# Patient Record
Sex: MALE | Race: WHITE | NOT HISPANIC OR LATINO | Employment: UNEMPLOYED | URBAN - METROPOLITAN AREA
[De-identification: names, ages, dates, MRNs, and addresses within clinical notes are randomized per-mention and may not be internally consistent; named-entity substitution may affect disease eponyms.]

---

## 2018-10-16 ENCOUNTER — OFFICE VISIT (OUTPATIENT)
Dept: FAMILY MEDICINE | Facility: CLINIC | Age: 23
End: 2018-10-16
Payer: COMMERCIAL

## 2018-10-16 VITALS
TEMPERATURE: 98.1 F | DIASTOLIC BLOOD PRESSURE: 72 MMHG | HEIGHT: 72 IN | WEIGHT: 164.6 LBS | BODY MASS INDEX: 22.29 KG/M2 | HEART RATE: 78 BPM | OXYGEN SATURATION: 97 % | SYSTOLIC BLOOD PRESSURE: 117 MMHG

## 2018-10-16 DIAGNOSIS — F33.1 MODERATE EPISODE OF RECURRENT MAJOR DEPRESSIVE DISORDER (H): ICD-10-CM

## 2018-10-16 DIAGNOSIS — Z23 NEED FOR VACCINATION: ICD-10-CM

## 2018-10-16 DIAGNOSIS — Z00.00 ROUTINE GENERAL MEDICAL EXAMINATION AT A HEALTH CARE FACILITY: Primary | ICD-10-CM

## 2018-10-16 PROCEDURE — 90686 IIV4 VACC NO PRSV 0.5 ML IM: CPT | Performed by: PHYSICIAN ASSISTANT

## 2018-10-16 PROCEDURE — 90472 IMMUNIZATION ADMIN EACH ADD: CPT | Performed by: PHYSICIAN ASSISTANT

## 2018-10-16 PROCEDURE — 90715 TDAP VACCINE 7 YRS/> IM: CPT | Performed by: PHYSICIAN ASSISTANT

## 2018-10-16 PROCEDURE — 99385 PREV VISIT NEW AGE 18-39: CPT | Mod: 25 | Performed by: PHYSICIAN ASSISTANT

## 2018-10-16 PROCEDURE — 99213 OFFICE O/P EST LOW 20 MIN: CPT | Mod: 25 | Performed by: PHYSICIAN ASSISTANT

## 2018-10-16 PROCEDURE — 90471 IMMUNIZATION ADMIN: CPT | Performed by: PHYSICIAN ASSISTANT

## 2018-10-16 RX ORDER — ESCITALOPRAM OXALATE 20 MG/1
TABLET ORAL
Qty: 30 TABLET | Refills: 5 | Status: SHIPPED | OUTPATIENT
Start: 2018-10-16 | End: 2019-06-18

## 2018-10-16 ASSESSMENT — PATIENT HEALTH QUESTIONNAIRE - PHQ9
SUM OF ALL RESPONSES TO PHQ QUESTIONS 1-9: 12
SUM OF ALL RESPONSES TO PHQ QUESTIONS 1-9: 12
10. IF YOU CHECKED OFF ANY PROBLEMS, HOW DIFFICULT HAVE THESE PROBLEMS MADE IT FOR YOU TO DO YOUR WORK, TAKE CARE OF THINGS AT HOME, OR GET ALONG WITH OTHER PEOPLE: VERY DIFFICULT

## 2018-10-16 NOTE — PROGRESS NOTES
SUBJECTIVE:   CC: Rivera Paz is an 23 year old male who presents for preventative health visit.     Physical   Annual:     Getting at least 3 servings of Calcium per day:  NO    Bi-annual eye exam:  Yes    Dental care twice a year:  NO    Sleep apnea or symptoms of sleep apnea:  Daytime drowsiness    Diet:  Regular (no restrictions)    Frequency of exercise:  1 day/week    Duration of exercise:  45-60 minutes    Taking medications regularly:  Yes    Medication side effects:  Not applicable and None    Additional concerns today:  No    22 y/o NP male here to discuss some ongoing depression.  He was diagnosed with depression around 17 and started with medications.  He has tried a few medications but has been on lexapro for the last while.  He was initially seen by psychiatry and then followed with pediatrics.  He has done some therapy for a few years, and does think that is was helpful.  He is thinking about going back.  He does feel safe at home and has no plans of hurting himself.    Besides mental health, he otherwise considers himself healthy.      He is active in music both listening and writing.      Today's PHQ-2 Score:   PHQ-2 ( 1999 [employer]) 10/16/2018   Q1: Little interest or pleasure in doing things 2   Q2: Feeling down, depressed or hopeless 2   PHQ-2 Score 4   Q1: Little interest or pleasure in doing things More than half the days   Q2: Feeling down, depressed or hopeless More than half the days   PHQ-2 Score 4     Abuse: Current or Past(Physical, Sexual or Emotional)- No  Do you feel safe in your environment - Yes    Social History   Substance Use Topics     Smoking status: Never Smoker     Smokeless tobacco: Never Used      Comment: Vape     Alcohol use Yes     Alcohol Use 10/16/2018   If you drink alcohol do you typically have greater than 3 drinks per day OR greater than 7 drinks per week? Yes   AUDIT SCORE  12     AUDIT - Alcohol Use Disorders Identification Test - Reproduced from the World  Health Organization Audit 2001 (Second Edition) 10/16/2018   1.  How often do you have a drink containing alcohol? 2 to 3 times a week   2.  How many drinks containing alcohol do you have on a typical day when you are drinking? 5 or 6   3.  How often do you have five or more drinks on one occasion? Less than monthly   4.  How often during the last year have you found that you were not able to stop drinking once you had started? Less than monthly   5.  How often during the last year have you failed to do what was normally expected of you because of drinking? Never   6.  How often during the last year have you needed a first drink in the morning to get yourself going after a heavy drinking session? Less than monthly   7.  How often during the last year have you had a feeling of guilt or remorse after drinking? Monthly   8.  How often during the last year have you been unable to remember what happened the night before because of your drinking? Never   9.  Have you or someone else been injured because of your drinking? No   10. Has a relative, friend, doctor or other health care worker been concerned about your drinking or suggested you cut down? Yes, but not in the last year   TOTAL SCORE 12       Last PSA: No results found for: PSA    Reviewed orders with patient. Reviewed health maintenance and updated orders accordingly - Yes  BP Readings from Last 3 Encounters:   10/16/18 117/72    Wt Readings from Last 3 Encounters:   10/16/18 164 lb 9.6 oz (74.7 kg)                    Reviewed and updated as needed this visit by clinical staff  Tobacco  Allergies  Meds  Soc Hx        Reviewed and updated as needed this visit by Provider            Review of Systems  CONSTITUTIONAL: NEGATIVE for fever, chills, change in weight  INTEGUMENTARY/SKIN: NEGATIVE for worrisome rashes, moles or lesions  EYES: NEGATIVE for vision changes or irritation  ENT: NEGATIVE for ear, mouth and throat problems  RESP: NEGATIVE for significant  cough or SOB  CV: NEGATIVE for chest pain, palpitations or peripheral edema  GI: NEGATIVE for nausea, abdominal pain, heartburn, or change in bowel habits   male: negative for dysuria, hematuria, decreased urinary stream, erectile dysfunction, urethral discharge  MUSCULOSKELETAL: NEGATIVE for significant arthralgias or myalgia  NEURO: NEGATIVE for weakness, dizziness or paresthesias  PSYCHIATRIC: as above    OBJECTIVE:   /72  Pulse 78  Temp 98.1  F (36.7  C) (Oral)  Ht 6' (1.829 m)  Wt 164 lb 9.6 oz (74.7 kg)  SpO2 97%  BMI 22.32 kg/m2    Physical Exam  GENERAL: alert and no distress  EYES: Eyes grossly normal to inspection, PERRL and conjunctivae and sclerae normal  HENT: ear canals and TM's normal, nose and mouth without ulcers or lesions  NECK: no adenopathy, no asymmetry, masses, or scars and thyroid normal to palpation  RESP: lungs clear to auscultation - no rales, rhonchi or wheezes  CV: regular rate and rhythm, normal S1 S2, no S3 or S4, no murmur, click or rub, no peripheral edema and peripheral pulses strong  ABDOMEN: soft, nontender, no hepatosplenomegaly, no masses and bowel sounds normal  MS: no gross musculoskeletal defects noted, no edema  SKIN: no suspicious lesions or rashes  NEURO: Normal strength and tone, mentation intact and speech normal  PSYCH: mentation appears normal, affect normal/bright    Diagnostic Test Results:  none     ASSESSMENT/PLAN:   1. Routine general medical examination at a health care facility      2. Moderate episode of recurrent major depressive disorder (H)  Will get him back on medication.  Discussed safety issues and if he does feel safe at home.  - escitalopram (LEXAPRO) 20 MG tablet; Take 1/2 tablet (10 mg) for 1-2 weeks, then increase to 1 tablet orally daily  Dispense: 30 tablet; Refill: 5    3. Need for vaccination    - TDAP VACCINE (ADACEL)  - FLU VAC PRESRV FREE QUAD SPLIT VIR, IM (3+ YRS)  - ADMIN 1st VACCINE  - EA ADD'L VACCINE    COUNSELING:    Reviewed preventive health counseling, as reflected in patient instructions       Regular exercise       Healthy diet/nutrition       Immunizations    Vaccinated for: Influenza and TDAP             Safe sex practices/STD prevention    BP Readings from Last 1 Encounters:   10/16/18 117/72     Estimated body mass index is 22.32 kg/(m^2) as calculated from the following:    Height as of this encounter: 6' (1.829 m).    Weight as of this encounter: 164 lb 9.6 oz (74.7 kg).           reports that he has never smoked. He has never used smokeless tobacco.      Counseling Resources:  ATP IV Guidelines  Pooled Cohorts Equation Calculator  FRAX Risk Assessment  ICSI Preventive Guidelines  Dietary Guidelines for Americans, 2010  USDA's MyPlate  ASA Prophylaxis  Lung CA Screening    Leeroy Ewing PA-C  [facility]

## 2018-10-16 NOTE — MR AVS SNAPSHOT
After Visit Summary   10/16/2018    Rivera Paz    MRN: [id number]           Patient Information     Date Of Birth          1995        Visit Information        Provider Department      10/16/2018 6:00 PM Leeroy Ewing PA-C [facility]        Today's Diagnoses     Routine general medical examination at a health care facility    -  1    Moderate episode of recurrent major depressive disorder (H)        Need for vaccination          Care Instructions      Preventive Health Recommendations  Male Ages 21 - 25     Yearly exam:             See your health care provider every year in order to  o   Review health changes.   o   Discuss preventive care.    o   Review your medicines if your doctor has prescribed any.    You should be tested each year for STDs (sexually transmitted diseases).     Talk to your provider about cholesterol testing.      If you are at risk for diabetes, you should have a diabetes test (fasting glucose).    Shots: Get a flu shot each year. Get a tetanus shot every 10 years.     Nutrition:    Eat at least 5 servings of fruits and vegetables daily.     Eat whole-grain bread, whole-wheat pasta and brown rice instead of white grains and rice.     Get adequate calcium and Vitamin D.     Lifestyle    Exercise for at least 150 minutes a week (30 minutes a day, 5 days a week). This will help you control your weight and prevent disease.     Limit alcohol to one drink per day.     No smoking.     Wear sunscreen to prevent skin cancer.     See your dentist every six months for an exam and cleaning.             Follow-ups after your visit        Who to contact     If you have questions or need follow up information about today's clinic visit or your schedule please contact [facility] directly at [phone].  Normal or non-critical lab and imaging results will be communicated to you by MyChart, letter or phone within 4 business days after the clinic  "has received the results. If you do not hear from us within 7 days, please contact the clinic through [employer] or phone. If you have a critical or abnormal lab result, we will notify you by phone as soon as possible.  Submit refill requests through [employer] or call your pharmacy and they will forward the refill request to us. Please allow 3 business days for your refill to be completed.          Additional Information About Your Visit        [employer] Information     [employer] lets you send messages to your doctor, view your test results, renew your prescriptions, schedule appointments and more. To sign up, go to [email or web address] . Click on \"Log in\" on the left side of the screen, which will take you to the Welcome page. Then click on \"Sign up Now\" on the right side of the page.     You will be asked to enter the access code listed below, as well as some personal information. Please follow the directions to create your username and password.     Your access code is: [email or web address]  Expires: 2019  6:25 PM     Your access code will  in 90 days. If you need help or a new code, please call your [place] clinic or [phone].        Care EveryWhere ID     This is your Care EveryWhere ID. This could be used by other organizations to access your [place] medical records  [id number]        Your Vitals Were     Pulse Temperature Height Pulse Oximetry BMI (Body Mass Index)       78 98.1  F (36.7  C) (Oral) 6' (1.829 m) 97% 22.32 kg/m2        Blood Pressure from Last 3 Encounters:   10/16/18 117/72    Weight from Last 3 Encounters:   10/16/18 164 lb 9.6 oz (74.7 kg)              We Performed the Following     ADMIN 1st VACCINE     EA ADD'L VACCINE     FLU VAC PRESRV FREE QUAD SPLIT VIR, IM (3+ YRS)     TDAP VACCINE (ADACEL)          Today's Medication Changes          These changes are accurate as of 10/16/18  6:25 PM.  If you have any questions, ask your nurse or doctor.               Start taking these " medicines.        Dose/Directions    escitalopram 20 MG tablet   Commonly known as:  LEXAPRO   Used for:  Moderate episode of recurrent major depressive disorder (H)   Started by:  Leeroy Ewing PA-C        Take 1/2 tablet (10 mg) for 1-2 weeks, then increase to 1 tablet orally daily   Quantity:  30 tablet   Refills:  5            Where to get your medicines      These medications were sent to [facility] [id number] [place] [address] [address] AT [facility] [address], [facility] [address]     Phone:  [phone]     escitalopram 20 MG tablet                Primary Care Provider Office Phone # Fax #    Leeroy Ewing PA-C [phone] [address] [address]        Equal Access to Services     BRITTANIE TREJO : Hadii aad ku hadasho Soomaali, waaxda luqadaha, qaybta kaalmada adeegyada, matthew pengin hayuvaldo booker . So [facility] [phone].    ATENCIÓN: Si habla español, tiene a floyd disposición servicios gratuitos de asistencia lingüística. Luiza al [phone].    We comply with applicable federal civil rights laws and [place] laws. We do not discriminate on the basis of race, color, national origin, age, disability, sex, sexual orientation, or gender identity.            Thank you!     Thank you for choosing [facility]  for your care. Our goal is always to provide you with excellent care. Hearing back from our patients is one way we can continue to improve our services. Please take a few minutes to complete the written survey that you may receive in the mail after your visit with us. Thank you!             Your Updated Medication List - Protect others around you: Learn how to safely use, store and throw away your medicines at www.disposemymeds.org.          This list is accurate as of 10/16/18  6:25 PM.  Always use your most recent med list.                   Brand Name Dispense Instructions  for use Diagnosis    escitalopram 20 MG tablet    LEXAPRO    30 tablet    Take 1/2 tablet (10 mg) for 1-2 weeks, then increase to 1 tablet orally daily    Moderate episode of recurrent major depressive disorder (H)

## 2018-10-17 ASSESSMENT — PATIENT HEALTH QUESTIONNAIRE - PHQ9: SUM OF ALL RESPONSES TO PHQ QUESTIONS 1-9: 12

## 2018-10-18 PROBLEM — F33.1 MODERATE EPISODE OF RECURRENT MAJOR DEPRESSIVE DISORDER (H): Status: ACTIVE | Noted: 2018-10-18

## 2019-02-14 ENCOUNTER — OFFICE VISIT (OUTPATIENT)
Dept: FAMILY MEDICINE | Facility: CLINIC | Age: 24
End: 2019-02-14
Payer: COMMERCIAL

## 2019-02-14 VITALS
DIASTOLIC BLOOD PRESSURE: 83 MMHG | SYSTOLIC BLOOD PRESSURE: 117 MMHG | OXYGEN SATURATION: 94 % | TEMPERATURE: 98.6 F | BODY MASS INDEX: 21.05 KG/M2 | HEART RATE: 106 BPM | WEIGHT: 155.4 LBS | HEIGHT: 72 IN

## 2019-02-14 DIAGNOSIS — F33.1 MODERATE EPISODE OF RECURRENT MAJOR DEPRESSIVE DISORDER (H): Primary | ICD-10-CM

## 2019-02-14 DIAGNOSIS — R41.840 INATTENTION: ICD-10-CM

## 2019-02-14 DIAGNOSIS — R30.0 DYSURIA: ICD-10-CM

## 2019-02-14 LAB
ALBUMIN UR-MCNC: 30 MG/DL
APPEARANCE UR: CLEAR
BILIRUB UR QL STRIP: ABNORMAL
CAOX CRY #/AREA URNS HPF: ABNORMAL /HPF
COLOR UR AUTO: YELLOW
GLUCOSE UR STRIP-MCNC: NEGATIVE MG/DL
HGB UR QL STRIP: NEGATIVE
KETONES UR STRIP-MCNC: 15 MG/DL
LEUKOCYTE ESTERASE UR QL STRIP: NEGATIVE
MUCOUS THREADS #/AREA URNS LPF: PRESENT /LPF
NITRATE UR QL: NEGATIVE
PH UR STRIP: 6.5 PH (ref 5–7)
RBC #/AREA URNS AUTO: ABNORMAL /HPF
SOURCE: ABNORMAL
SP GR UR STRIP: 1.02 (ref 1–1.03)
UROBILINOGEN UR STRIP-ACNC: 2 EU/DL (ref 0.2–1)
WBC #/AREA URNS AUTO: ABNORMAL /HPF

## 2019-02-14 PROCEDURE — 99214 OFFICE O/P EST MOD 30 MIN: CPT | Performed by: PHYSICIAN ASSISTANT

## 2019-02-14 PROCEDURE — 81001 URINALYSIS AUTO W/SCOPE: CPT | Performed by: PHYSICIAN ASSISTANT

## 2019-02-14 RX ORDER — BUPROPION HYDROCHLORIDE 150 MG/1
150 TABLET ORAL EVERY MORNING
Qty: 30 TABLET | Refills: 3 | Status: SHIPPED | OUTPATIENT
Start: 2019-02-14 | End: 2019-06-18

## 2019-02-14 ASSESSMENT — MIFFLIN-ST. JEOR: SCORE: 1737.89

## 2019-02-14 NOTE — PROGRESS NOTES
SUBJECTIVE:   Rivera Paz is a 23 year old male who presents to clinic today for the following health issues:      Pt requesting ADD medication/concerta    Problem list and histories reviewed & adjusted, as indicated.  Additional history: 22 y/o male here to discuss ADD.  He was diagnosed when he was very young.  States he was on concerta for awhile, but had some side effects, trouble eating so did stop.  Took very shortly in high school but also had side effects.      He has been having more symptoms of fatigue and trouble with work.  Has been diagnosed with depression and is currently on lexapro.  He does think he depression is fairly well controlled, but does sometimes lack motivation.    At the end of the visit, he did mention that he has had long term discomfort when he urinates.  He was evaluated by UC in the past, treated for infection and referred to urology.  Never followed up.    BP Readings from Last 3 Encounters:   02/14/19 117/83   10/16/18 117/72    Wt Readings from Last 3 Encounters:   02/14/19 70.5 kg (155 lb 6.4 oz)   10/16/18 74.7 kg (164 lb 9.6 oz)                    Reviewed and updated as needed this visit by clinical staff       Reviewed and updated as needed this visit by Provider         ROS:  Constitutional, HEENT, cardiovascular, pulmonary, gi and gu systems are negative, except as otherwise noted.    OBJECTIVE:     /83   Pulse 106   Temp 98.6  F (37  C) (Oral)   Ht 1.829 m (6')   Wt 70.5 kg (155 lb 6.4 oz)   SpO2 94%   BMI 21.08 kg/m    Body mass index is 21.08 kg/m .  GENERAL: alert and no distress  EYES: Eyes grossly normal to inspection  HENT: ear canals and TM's normal, nose and mouth without ulcers or lesions  RESP: lungs clear to auscultation - no rales, rhonchi or wheezes  CV: regular rate and rhythm, normal S1 S2, no S3 or S4, no murmur, click or rub, no peripheral edema and peripheral pulses strong  PSYCH: mentation appears normal, affect  normal/bright    Diagnostic Test Results:  none     ASSESSMENT/PLAN:             1. Moderate episode of recurrent major depressive disorder (H)  Discussed his symptoms, and we both agree that further testing to help fully determine if ADD playing a role.  Discussed wellbutrin may help his depression and has been used for ADHD as well.   We discussed the potential benefits and side effects including making symptoms worse and he did want to try.    - buPROPion (WELLBUTRIN XL) 150 MG 24 hr tablet; Take 1 tablet (150 mg) by mouth every morning  Dispense: 30 tablet; Refill: 3  - Urine Microscopic    2. Inattention    - MENTAL HEALTH REFERRAL  - Adult; Assessments and Testing; ADHD; [facility] [phone]; We will contact you to schedule the appointment or please call with any questions    3. Dysuria  Will contact him with results of UA to determine next step.  - *UA reflex to Microscopic and Culture ([place] [facility] (except [place] and [place])    Would like him to contact me via mychart or phone in 1-2 weeks and then again in 4 months.    Leeroy Ewing PA-C  [facility]

## 2019-02-14 NOTE — NURSING NOTE
Chief Complaint   Patient presents with     Medication Request     concerta     /83   Pulse 106   Temp 98.6  F (37  C) (Oral)   Ht 1.829 m (6')   Wt 70.5 kg (155 lb 6.4 oz)   SpO2 94%   BMI 21.08 kg/m   Estimated body mass index is 21.08 kg/m  as calculated from the following:    Height as of this encounter: 1.829 m (6').    Weight as of this encounter: 70.5 kg (155 lb 6.4 oz).        Health Maintenance due pending provider review:  NONE    n/a    Betzaida Browning, JEAN

## 2019-02-26 NOTE — RESULT ENCOUNTER NOTE
Dear Rivera    I just wanted to check in with you and your recent urine tests that we did at our visit.  I know you mentioned some discomfort with urinating, and that you had this going back to last year.  When I look at your urine test from our visit and compare it to the one in the fall, you show a bit of protein in your urine both times.  Last time you had some bacteria, which is why they treated you with antibiotic.  Since this has been on going, I think our next step is to check some blood work to see if your kidneys are related, or if this is purely a urinary/bladder issue.  That way, if we need to see a specialist, we know who is the most appropriate.  You do not need to see me, but can just schedule a lab only appointment where we will recheck your urine and check some blood work.  Let me know if that works, or if you have any further questions.        Darryl Ewing PA-C

## 2019-06-13 DIAGNOSIS — F33.1 MODERATE EPISODE OF RECURRENT MAJOR DEPRESSIVE DISORDER (H): ICD-10-CM

## 2019-06-13 NOTE — TELEPHONE ENCOUNTER
"Bupropion XL   Last Written Prescription Date:  02/14/2019  Last Fill Quantity: 30,  # refills: 3   Last office visit: 2/14/2019 with prescribing provider:  Yes    Future Office Visit:      Lexapro  Last Written Prescription Date:  10/16/2018  Last Fill Quantity: 30,  # refills: 5   Last office visit: 2/14/2019 with prescribing provider:  Yes    Future Office Visit:      Requested Prescriptions   Pending Prescriptions Disp Refills     buPROPion (WELLBUTRIN XL) 150 MG 24 hr tablet 30 tablet 3     Sig: Take 1 tablet (150 mg) by mouth every morning       SSRIs Protocol Failed - 6/13/2019 12:47 PM        Failed - PHQ-9 score less than 5 in past 6 months     Please review last PHQ-9 score.           Passed - Medication is Bupropion     If the medication is Bupropion (Wellbutrin), and the patient is taking for smoking cessation; OK to refill.          Passed - Medication is active on med list        Passed - Patient is age 18 or older        Passed - Recent (6 mo) or future (30 days) visit within the authorizing provider's specialty     Patient had office visit in the last 6 months or has a visit in the next 30 days with authorizing provider or within the authorizing provider's specialty.  See \"Patient Info\" tab in inbasket, or \"Choose Columns\" in Meds & Orders section of the refill encounter.            escitalopram (LEXAPRO) 20 MG tablet 30 tablet 5     Sig: Take 1/2 tablet (10 mg) for 1-2 weeks, then increase to 1 tablet orally daily       SSRIs Protocol Failed - 6/13/2019 12:47 PM        Failed - PHQ-9 score less than 5 in past 6 months     Please review last PHQ-9 score.           Passed - Medication is Bupropion     If the medication is Bupropion (Wellbutrin), and the patient is taking for smoking cessation; OK to refill.          Passed - Medication is active on med list        Passed - Patient is age 18 or older        Passed - Recent (6 mo) or future (30 days) visit within the authorizing provider's specialty     " "Patient had office visit in the last 6 months or has a visit in the next 30 days with authorizing provider or within the authorizing provider's specialty.  See \"Patient Info\" tab in inbasket, or \"Choose Columns\" in Meds & Orders section of the refill encounter.              "

## 2019-06-13 NOTE — TELEPHONE ENCOUNTER
PHQ9 sent to patient via [employer]. (last done 10/16/18)  Note from 2/14/19 OV:  Would like him to contact me via [employer] or phone in 1-2 weeks and then again in 4 months.    Debora Anne RN

## 2019-06-18 ENCOUNTER — MYC REFILL (OUTPATIENT)
Dept: FAMILY MEDICINE | Facility: CLINIC | Age: 24
End: 2019-06-18

## 2019-06-18 DIAGNOSIS — F33.1 MODERATE EPISODE OF RECURRENT MAJOR DEPRESSIVE DISORDER (H): ICD-10-CM

## 2019-06-18 RX ORDER — BUPROPION HYDROCHLORIDE 150 MG/1
150 TABLET ORAL EVERY MORNING
Qty: 90 TABLET | Refills: 0 | Status: SHIPPED | OUTPATIENT
Start: 2019-06-18 | End: 2019-08-29

## 2019-06-18 RX ORDER — BUPROPION HYDROCHLORIDE 150 MG/1
150 TABLET ORAL EVERY MORNING
Qty: 30 TABLET | Refills: 3 | Status: CANCELLED | OUTPATIENT
Start: 2019-06-18

## 2019-06-18 RX ORDER — ESCITALOPRAM OXALATE 20 MG/1
20 TABLET ORAL DAILY
Qty: 90 TABLET | Refills: 0 | Status: SHIPPED | OUTPATIENT
Start: 2019-06-18 | End: 2019-08-29

## 2019-06-18 RX ORDER — ESCITALOPRAM OXALATE 20 MG/1
TABLET ORAL
Qty: 30 TABLET | Refills: 5 | Status: CANCELLED | OUTPATIENT
Start: 2019-06-18

## 2019-06-18 ASSESSMENT — PATIENT HEALTH QUESTIONNAIRE - PHQ9: SUM OF ALL RESPONSES TO PHQ QUESTIONS 1-9: 0

## 2019-06-18 NOTE — TELEPHONE ENCOUNTER
PHQ9 updated via [employer]  Due for physical Oct 2019  Prescription approved per [facility] Refill Protocol.  Lizeth GATES RN

## 2019-06-18 NOTE — TELEPHONE ENCOUNTER
See other TE  Prescription approved per [facility] Refill Protocol today  MyChart sent to pt  Lizeth GATES RN

## 2019-08-29 ENCOUNTER — OFFICE VISIT (OUTPATIENT)
Dept: FAMILY MEDICINE | Facility: CLINIC | Age: 24
End: 2019-08-29
Payer: COMMERCIAL

## 2019-08-29 VITALS
OXYGEN SATURATION: 100 % | DIASTOLIC BLOOD PRESSURE: 82 MMHG | RESPIRATION RATE: 16 BRPM | TEMPERATURE: 98.5 F | WEIGHT: 158.6 LBS | HEIGHT: 72 IN | BODY MASS INDEX: 21.48 KG/M2 | SYSTOLIC BLOOD PRESSURE: 119 MMHG | HEART RATE: 78 BPM

## 2019-08-29 DIAGNOSIS — R80.9 PROTEINURIA, UNSPECIFIED TYPE: ICD-10-CM

## 2019-08-29 DIAGNOSIS — Z00.00 ROUTINE GENERAL MEDICAL EXAMINATION AT A HEALTH CARE FACILITY: Primary | ICD-10-CM

## 2019-08-29 DIAGNOSIS — F33.1 MODERATE EPISODE OF RECURRENT MAJOR DEPRESSIVE DISORDER (H): ICD-10-CM

## 2019-08-29 LAB
ALBUMIN UR-MCNC: 30 MG/DL
APPEARANCE UR: CLEAR
BILIRUB UR QL STRIP: NEGATIVE
COLOR UR AUTO: YELLOW
GLUCOSE UR STRIP-MCNC: NEGATIVE MG/DL
HGB UR QL STRIP: NEGATIVE
KETONES UR STRIP-MCNC: NEGATIVE MG/DL
LEUKOCYTE ESTERASE UR QL STRIP: NEGATIVE
MUCOUS THREADS #/AREA URNS LPF: PRESENT /LPF
NITRATE UR QL: NEGATIVE
PH UR STRIP: 8.5 PH (ref 5–7)
RBC #/AREA URNS AUTO: ABNORMAL /HPF
SOURCE: ABNORMAL
SP GR UR STRIP: 1.01 (ref 1–1.03)
UROBILINOGEN UR STRIP-ACNC: 1 EU/DL (ref 0.2–1)
WBC #/AREA URNS AUTO: ABNORMAL /HPF

## 2019-08-29 PROCEDURE — 99395 PREV VISIT EST AGE 18-39: CPT | Performed by: PHYSICIAN ASSISTANT

## 2019-08-29 PROCEDURE — 81001 URINALYSIS AUTO W/SCOPE: CPT | Performed by: PHYSICIAN ASSISTANT

## 2019-08-29 PROCEDURE — 99213 OFFICE O/P EST LOW 20 MIN: CPT | Mod: 25 | Performed by: PHYSICIAN ASSISTANT

## 2019-08-29 RX ORDER — BUPROPION HYDROCHLORIDE 150 MG/1
150 TABLET ORAL EVERY MORNING
Qty: 90 TABLET | Refills: 3 | Status: SHIPPED | OUTPATIENT
Start: 2019-08-29 | End: 2020-11-22

## 2019-08-29 RX ORDER — ESCITALOPRAM OXALATE 20 MG/1
20 TABLET ORAL DAILY
Qty: 90 TABLET | Refills: 3 | Status: SHIPPED | OUTPATIENT
Start: 2019-08-29 | End: 2020-11-22

## 2019-08-29 ASSESSMENT — PATIENT HEALTH QUESTIONNAIRE - PHQ9
SUM OF ALL RESPONSES TO PHQ QUESTIONS 1-9: 13
10. IF YOU CHECKED OFF ANY PROBLEMS, HOW DIFFICULT HAVE THESE PROBLEMS MADE IT FOR YOU TO DO YOUR WORK, TAKE CARE OF THINGS AT HOME, OR GET ALONG WITH OTHER PEOPLE: SOMEWHAT DIFFICULT
SUM OF ALL RESPONSES TO PHQ QUESTIONS 1-9: 13

## 2019-08-29 ASSESSMENT — MIFFLIN-ST. JEOR: SCORE: 1752.4

## 2019-08-29 NOTE — PROGRESS NOTES
Answers for HPI/ROS submitted by the patient on 8/29/2019   Annual Exam:  If you checked off any problems, how difficult have these problems made it for you to do your work, take care of things at home, or get along with other people?: Somewhat difficult  PHQ9 TOTAL SCORE: 13    Conflicting answers have been found for some questions. Please document the patient's answers manually. ***SUBJECTIVE:   CC: Rivera Paz is an 23 year old male who presents for preventative health visit.     Healthy Habits:     Getting at least 3 servings of Calcium per day:  Yes    Bi-annual eye exam:  Yes    Dental care twice a year:  NO    Sleep apnea or symptoms of sleep apnea:  Daytime drowsiness    Diet:  Regular (no restrictions)    Frequency of exercise:  4-5 days/week    Duration of exercise:  15-30 minutes    Taking medications regularly:  Yes    Medication side effects:  None    PHQ-2 Total Score: 3    Additional concerns today:  No    {Add if <65 person on Medicare  - Required Questions (Optional):922477}  {Outside tests to abstract? :317386}    {additional problems to add (Optional):669381}    Today's PHQ-2 Score:   PHQ-2 ( 1999 Pfizer) 8/29/2019   Q1: Little interest or pleasure in doing things 1   Q2: Feeling down, depressed or hopeless 2   PHQ-2 Score 3   Q1: Little interest or pleasure in doing things Several days   Q2: Feeling down, depressed or hopeless More than half the days   PHQ-2 Score 3       Abuse: Current or Past(Physical, Sexual or Emotional)- { :487834}  Do you feel safe in your environment? { :388697}    Social History     Tobacco Use     Smoking status: Never Smoker     Smokeless tobacco: Never Used     Tobacco comment: Vape   Substance Use Topics     Alcohol use: Yes     {Rooming Staff- Complete this question if Prescreen response is not shown below for today's visit. If you drink alcohol do you typically have >3 drinks per day or >7 drinks per week? (Optional):591008}    Alcohol Use 8/29/2019  "  Prescreen: >3 drinks/day or >7 drinks/week? Yes   Prescreen: >3 drinks/day or >7 drinks/week? -   AUDIT SCORE  17   {add AUDIT responses (Optional) (A score of 7 for adult men is an indication of hazardous drinking; a score of 8 or more is an indication of an alcohol use disorder.  A score of 7 or more for adult women is an indication of hazardous drinking or an alchohol use disorder):365443}    Last PSA: No results found for: PSA    Reviewed orders with patient. Reviewed health maintenance and updated orders accordingly - { :065037::\"Yes\"}  {Chronicprobdata (optional):178940}    Reviewed and updated as needed this visit by clinical staff  Tobacco  Allergies  Meds  Problems  Med Hx  Surg Hx  Fam Hx  Soc Hx          Reviewed and updated as needed this visit by Provider        {HISTORY OPTIONS (Optional):126281}    Review of Systems  {MALE ROS (Optional):159490::\"CONSTITUTIONAL: NEGATIVE for fever, chills, change in weight\",\"INTEGUMENTARY/SKIN: NEGATIVE for worrisome rashes, moles or lesions\",\"EYES: NEGATIVE for vision changes or irritation\",\"ENT: NEGATIVE for ear, mouth and throat problems\",\"RESP: NEGATIVE for significant cough or SOB\",\"CV: NEGATIVE for chest pain, palpitations or peripheral edema\",\"GI: NEGATIVE for nausea, abdominal pain, heartburn, or change in bowel habits\",\" male: negative for dysuria, hematuria, decreased urinary stream, erectile dysfunction, urethral discharge\",\"MUSCULOSKELETAL: NEGATIVE for significant arthralgias or myalgia\",\"NEURO: NEGATIVE for weakness, dizziness or paresthesias\",\"PSYCHIATRIC: NEGATIVE for changes in mood or affect\"}    OBJECTIVE:   There were no vitals taken for this visit.    Physical Exam  {Exam Choices (Optional):322633}    {Diagnostic Test Results (Optional):833323::\"Diagnostic Test Results:\",\"Labs reviewed in Epic\"}    ASSESSMENT/PLAN:   {Diag Picklist:284888}    COUNSELING:   {MALE COUNSELING MESSAGES:502210::\"Reviewed preventive health counseling, as " "reflected in patient instructions\"}    Estimated body mass index is 21.08 kg/m  as calculated from the following:    Height as of 2/14/19: 1.829 m (6').    Weight as of 2/14/19: 70.5 kg (155 lb 6.4 oz).     {Weight Management Plan (ACO) Complete if BMI is abnormal-  Ages 18-64  BMI >24.9.  Age 65+ with BMI <23 or >30 (Optional):565689}     reports that he has never smoked. He has never used smokeless tobacco.  {Tobacco Cessation -- Complete if patient is a smoker (Optional):562725}    Counseling Resources:  ATP IV Guidelines  Pooled Cohorts Equation Calculator  FRAX Risk Assessment  ICSI Preventive Guidelines  Dietary Guidelines for Americans, 2010  USDA's MyPlate  ASA Prophylaxis  Lung CA Screening    Leeroy Ewing PA-C  [facility]  "

## 2019-08-29 NOTE — PROGRESS NOTES
SUBJECTIVE:   CC: Rivera Paz is an 23 year old male who presents for preventative health visit.     Healthy Habits:     Getting at least 3 servings of Calcium per day:  Yes    Bi-annual eye exam:  Yes    Dental care twice a year:  NO    Sleep apnea or symptoms of sleep apnea:  Daytime drowsiness    Diet:  Regular (no restrictions)    Frequency of exercise:  4-5 days/week    Duration of exercise:  15-30 minutes    Taking medications regularly:  Yes    Medication side effects:  None    PHQ-2 Total Score: 3    Additional concerns today:  No          Depression Followup    How are you doing with your depression since your last visit? Improved     Are you having other symptoms that might be associated with depression? No    Have you had a significant life event?  No     Are you feeling anxious or having panic attacks?   No    Do you have any concerns with your use of alcohol or other drugs? No    Social History     Tobacco Use     Smoking status: Never Smoker     Smokeless tobacco: Never Used     Tobacco comment: Vape   Substance Use Topics     Alcohol use: Yes     Drug use: Yes     Types: Marijuana     PHQ 10/16/2018 6/18/2019 8/29/2019   PHQ-9 Total Score 12 0 13   Q9: Thoughts of better off dead/self-harm past 2 weeks Several days Not at all Several days   F/U: Thoughts of suicide or self-harm Yes - No   F/U: Safety concerns No - No     No flowsheet data found.  No flowsheet data found.  In the past two weeks have you had thoughts of suicide or self-harm?  No.    Do you have concerns about your personal safety or the safety of others?   No    Suicide Assessment Five-step Evaluation and Treatment (SAFE-T)    Today's PHQ-2 Score:   PHQ-2 ( 1999 Pfizer) 8/29/2019   Q1: Little interest or pleasure in doing things 1   Q2: Feeling down, depressed or hopeless 2   PHQ-2 Score 3   Q1: Little interest or pleasure in doing things Several days   Q2: Feeling down, depressed or hopeless More than half the days   PHQ-2  Score 3       Abuse: Current or Past(Physical, Sexual or Emotional)- No  Do you feel safe in your environment? Yes    Social History     Tobacco Use     Smoking status: Never Smoker     Smokeless tobacco: Never Used     Tobacco comment: Vape   Substance Use Topics     Alcohol use: Yes     If you drink alcohol do you typically have >3 drinks per day or >7 drinks per week? No    Alcohol Use 8/29/2019   Prescreen: >3 drinks/day or >7 drinks/week? Yes   Prescreen: >3 drinks/day or >7 drinks/week? -   AUDIT SCORE  17     AUDIT - Alcohol Use Disorders Identification Test - Reproduced from the World Health Organization Audit 2001 (Second Edition) 8/29/2019   1.  How often do you have a drink containing alcohol? 4 or more times a week   2.  How many drinks containing alcohol do you have on a typical day when you are drinking? 3 or 4   3.  How often do you have five or more drinks on one occasion? Monthly   4.  How often during the last year have you found that you were not able to stop drinking once you had started? Less than monthly   5.  How often during the last year have you failed to do what was normally expected of you because of drinking? Less than monthly   6.  How often during the last year have you needed a first drink in the morning to get yourself going after a heavy drinking session? Less than monthly   7.  How often during the last year have you had a feeling of guilt or remorse after drinking? Monthly   8.  How often during the last year have you been unable to remember what happened the night before because of your drinking? Less than monthly   9.  Have you or someone else been injured because of your drinking? No   10. Has a relative, friend, doctor or other health care worker been concerned about your drinking or suggested you cut down? Yes, during the last year   TOTAL SCORE 17       Last PSA: No results found for: PSA    Reviewed orders with patient. Reviewed health maintenance and updated orders  accordingly - Yes  BP Readings from Last 3 Encounters:   08/29/19 119/82   02/14/19 117/83   10/16/18 117/72    Wt Readings from Last 3 Encounters:   08/29/19 71.9 kg (158 lb 9.6 oz)   02/14/19 70.5 kg (155 lb 6.4 oz)   10/16/18 74.7 kg (164 lb 9.6 oz)                    Reviewed and updated as needed this visit by clinical staff  Tobacco  Allergies  Meds  Problems  Med Hx  Surg Hx  Fam Hx  Soc Hx          22 y/o male here for well exam.  He does have hx of depression, and has bumped up on his PHQ 9 from last time.  Despite this, he actually feels like things are going ok.  He does think the meds are helping.  He is interested in starting counseling.  Admits that he thinks he might be drinking too much alcohol, but also thinks that just may be the summer.  He does not feel like he is using it to cope or treat his symptoms.  He does not have any thoughts of suicide and does not have any concerns over his safety.    At our last visits, he was having some urinary symptoms.  UA did show some protein in his urine.  I did encourage him to follow up with some labs.  He did not follow up, as he says that his symptoms have essentially resolved.    Reviewed and updated as needed this visit by Provider            Review of Systems  CONSTITUTIONAL: NEGATIVE for fever, chills, change in weight  INTEGUMENTARY/SKIN: NEGATIVE for worrisome rashes, moles or lesions  EYES: NEGATIVE for vision changes or irritation  ENT: NEGATIVE for ear, mouth and throat problems  RESP: NEGATIVE for significant cough or SOB  CV: NEGATIVE for chest pain, palpitations or peripheral edema  GI: NEGATIVE for nausea, abdominal pain, heartburn, or change in bowel habits   male: negative for dysuria, hematuria, decreased urinary stream, erectile dysfunction, urethral discharge  MUSCULOSKELETAL: NEGATIVE for significant arthralgias or myalgia  NEURO: NEGATIVE for weakness, dizziness or paresthesias  PSYCHIATRIC: NEGATIVE for changes in mood or  affect    OBJECTIVE:   /82   Pulse 78   Temp 98.5  F (36.9  C) (Oral)   Resp 16   Ht 1.829 m (6')   Wt 71.9 kg (158 lb 9.6 oz)   SpO2 100%   BMI 21.51 kg/m      Physical Exam  GENERAL: alert and no distress  EYES: Eyes grossly normal to inspection, PERRL and conjunctivae and sclerae normal  HENT: ear canals and TM's normal, nose and mouth without ulcers or lesions  NECK: no adenopathy, no asymmetry, masses, or scars and thyroid normal to palpation  RESP: lungs clear to auscultation - no rales, rhonchi or wheezes  CV: regular rate and rhythm, normal S1 S2, no S3 or S4, no murmur, click or rub, no peripheral edema and peripheral pulses strong  ABDOMEN: soft, nontender, no hepatosplenomegaly, no masses and bowel sounds normal  MS: no gross musculoskeletal defects noted, no edema  SKIN: no suspicious lesions or rashes  NEURO: Normal strength and tone, mentation intact and speech normal  PSYCH: mentation appears normal, affect normal/bright    Diagnostic Test Results:  Labs reviewed in Epic  UA:  30 mg/dL proteinuria    ASSESSMENT/PLAN:   1. Routine general medical examination at a health care facility    - *UA reflex to Microscopic and Culture ([place] and [facility] (except [place] and [place])  - Urine Microscopic    2. Moderate episode of recurrent major depressive disorder (H)  Refilled meds.  We both agree that counseling is a good next step to continue to work on symptoms.    - MENTAL HEALTH REFERRAL  - Adult; Outpatient Treatment; Individual/Couples/Family/Group Therapy/Health Psychology; [facility]: [facility] [phone]; We will contact you to schedule the appointment or please call with any questions  - buPROPion (WELLBUTRIN XL) 150 MG 24 hr tablet; Take 1 tablet (150 mg) by mouth every morning  Dispense: 90 tablet; Refill: 3  - escitalopram (LEXAPRO) 20 MG tablet; Take 1 tablet (20 mg) by mouth daily  Dispense: 90 tablet; Refill: 3    3.  Proteinuria  Was collected and ran  after he left.  Still shows protein.  Will have him follow up for renal panel and CBC.    COUNSELING:   Reviewed preventive health counseling, as reflected in patient instructions       Regular exercise       Healthy diet/nutrition       Alcohol Use    Estimated body mass index is 21.51 kg/m  as calculated from the following:    Height as of this encounter: 1.829 m (6').    Weight as of this encounter: 71.9 kg (158 lb 9.6 oz).          reports that he has never smoked. He has never used smokeless tobacco.      Counseling Resources:  ATP IV Guidelines  Pooled Cohorts Equation Calculator  FRAX Risk Assessment  ICSI Preventive Guidelines  Dietary Guidelines for Americans, 2010  USDA's MyPlate  ASA Prophylaxis  Lung CA Screening    Leeroy Ewing PA-C  [facility]

## 2019-08-30 ENCOUNTER — TELEPHONE (OUTPATIENT)
Dept: FAMILY MEDICINE | Facility: CLINIC | Age: 24
End: 2019-08-30

## 2019-08-30 ASSESSMENT — PATIENT HEALTH QUESTIONNAIRE - PHQ9: SUM OF ALL RESPONSES TO PHQ QUESTIONS 1-9: 13

## 2019-08-30 NOTE — RESULT ENCOUNTER NOTE
Please call with results.    His urine continues to show low levels of protein.  I would like him to return for lab visit to check his kidney function a bit closer.  This can be a normal finding in young adults, but I want to rule other causes out.    Darryl Ewing PA-C

## 2019-08-30 NOTE — TELEPHONE ENCOUNTER
Called cell and VM is full.  Tori Ewing, Leeroy Stafford PA-C  P Up Triage             Please call with results.     His urine continues to show low levels of protein.  I would like him to return for lab visit to check his kidney function a bit closer.  This can be a normal finding in young adults, but I want to rule other causes out.     Darryl Ewing PA-C

## 2019-09-03 NOTE — TELEPHONE ENCOUNTER
JS,  FYI:  Tried to call pt again today for 2nd time   VM is not setup  Sent MyChart advising he call clinic  Lizeth GATES RN

## 2020-03-11 ENCOUNTER — HEALTH MAINTENANCE LETTER (OUTPATIENT)
Age: 25
End: 2020-03-11

## 2020-12-29 ENCOUNTER — MYC REFILL (OUTPATIENT)
Dept: FAMILY MEDICINE | Facility: CLINIC | Age: 25
End: 2020-12-29

## 2020-12-29 DIAGNOSIS — F33.1 MODERATE EPISODE OF RECURRENT MAJOR DEPRESSIVE DISORDER (H): ICD-10-CM

## 2020-12-29 RX ORDER — BUPROPION HYDROCHLORIDE 150 MG/1
150 TABLET ORAL EVERY MORNING
Qty: 30 TABLET | Refills: 0 | Status: CANCELLED | OUTPATIENT
Start: 2020-12-29

## 2020-12-29 RX ORDER — ESCITALOPRAM OXALATE 20 MG/1
20 TABLET ORAL DAILY
Qty: 30 TABLET | Refills: 0 | Status: CANCELLED | OUTPATIENT
Start: 2020-12-29

## 2021-01-03 ENCOUNTER — HEALTH MAINTENANCE LETTER (OUTPATIENT)
Age: 26
End: 2021-01-03

## 2021-01-04 ENCOUNTER — OFFICE VISIT (OUTPATIENT)
Dept: FAMILY MEDICINE | Facility: CLINIC | Age: 26
End: 2021-01-04
Payer: COMMERCIAL

## 2021-01-04 VITALS
WEIGHT: 166.1 LBS | OXYGEN SATURATION: 94 % | HEIGHT: 73 IN | HEART RATE: 100 BPM | SYSTOLIC BLOOD PRESSURE: 133 MMHG | BODY MASS INDEX: 22.01 KG/M2 | TEMPERATURE: 97.7 F | DIASTOLIC BLOOD PRESSURE: 87 MMHG

## 2021-01-04 DIAGNOSIS — Z00.00 ROUTINE GENERAL MEDICAL EXAMINATION AT A HEALTH CARE FACILITY: Primary | ICD-10-CM

## 2021-01-04 DIAGNOSIS — R80.9 PROTEINURIA, UNSPECIFIED TYPE: ICD-10-CM

## 2021-01-04 DIAGNOSIS — Z23 NEED FOR VACCINATION: ICD-10-CM

## 2021-01-04 DIAGNOSIS — F33.1 MODERATE EPISODE OF RECURRENT MAJOR DEPRESSIVE DISORDER (H): ICD-10-CM

## 2021-01-04 LAB
ALBUMIN UR-MCNC: NEGATIVE MG/DL
APPEARANCE UR: CLEAR
BASOPHILS # BLD AUTO: 0 10E9/L (ref 0–0.2)
BASOPHILS NFR BLD AUTO: 0.4 %
BILIRUB UR QL STRIP: ABNORMAL
COLOR UR AUTO: YELLOW
DIFFERENTIAL METHOD BLD: NORMAL
EOSINOPHIL # BLD AUTO: 0.1 10E9/L (ref 0–0.7)
EOSINOPHIL NFR BLD AUTO: 2.2 %
ERYTHROCYTE [DISTWIDTH] IN BLOOD BY AUTOMATED COUNT: 11.9 % (ref 10–15)
GLUCOSE UR STRIP-MCNC: NEGATIVE MG/DL
HCT VFR BLD AUTO: 43.3 % (ref 40–53)
HGB BLD-MCNC: 14.8 G/DL (ref 13.3–17.7)
HGB UR QL STRIP: NEGATIVE
KETONES UR STRIP-MCNC: ABNORMAL MG/DL
LEUKOCYTE ESTERASE UR QL STRIP: NEGATIVE
LYMPHOCYTES # BLD AUTO: 2.2 10E9/L (ref 0.8–5.3)
LYMPHOCYTES NFR BLD AUTO: 40.7 %
MCH RBC QN AUTO: 33 PG (ref 26.5–33)
MCHC RBC AUTO-ENTMCNC: 34.2 G/DL (ref 31.5–36.5)
MCV RBC AUTO: 97 FL (ref 78–100)
MONOCYTES # BLD AUTO: 0.5 10E9/L (ref 0–1.3)
MONOCYTES NFR BLD AUTO: 9.9 %
NEUTROPHILS # BLD AUTO: 2.5 10E9/L (ref 1.6–8.3)
NEUTROPHILS NFR BLD AUTO: 46.8 %
NITRATE UR QL: NEGATIVE
PH UR STRIP: 7 PH (ref 5–7)
PLATELET # BLD AUTO: 180 10E9/L (ref 150–450)
RBC # BLD AUTO: 4.48 10E12/L (ref 4.4–5.9)
RBC #/AREA URNS AUTO: ABNORMAL /HPF
SOURCE: ABNORMAL
SP GR UR STRIP: 1.02 (ref 1–1.03)
UROBILINOGEN UR STRIP-ACNC: 0.2 EU/DL (ref 0.2–1)
WBC # BLD AUTO: 5.4 10E9/L (ref 4–11)
WBC #/AREA URNS AUTO: ABNORMAL /HPF

## 2021-01-04 PROCEDURE — 81001 URINALYSIS AUTO W/SCOPE: CPT | Performed by: PHYSICIAN ASSISTANT

## 2021-01-04 PROCEDURE — 36415 COLL VENOUS BLD VENIPUNCTURE: CPT | Performed by: PHYSICIAN ASSISTANT

## 2021-01-04 PROCEDURE — 99395 PREV VISIT EST AGE 18-39: CPT | Mod: 25 | Performed by: PHYSICIAN ASSISTANT

## 2021-01-04 PROCEDURE — 85025 COMPLETE CBC W/AUTO DIFF WBC: CPT | Performed by: PHYSICIAN ASSISTANT

## 2021-01-04 PROCEDURE — 80053 COMPREHEN METABOLIC PANEL: CPT | Performed by: PHYSICIAN ASSISTANT

## 2021-01-04 PROCEDURE — 90471 IMMUNIZATION ADMIN: CPT | Performed by: PHYSICIAN ASSISTANT

## 2021-01-04 PROCEDURE — 90686 IIV4 VACC NO PRSV 0.5 ML IM: CPT | Performed by: PHYSICIAN ASSISTANT

## 2021-01-04 RX ORDER — BUPROPION HYDROCHLORIDE 150 MG/1
150 TABLET ORAL EVERY MORNING
Qty: 90 TABLET | Refills: 3 | Status: SHIPPED | OUTPATIENT
Start: 2021-01-04 | End: 2022-01-05

## 2021-01-04 RX ORDER — ESCITALOPRAM OXALATE 20 MG/1
20 TABLET ORAL DAILY
Qty: 90 TABLET | Refills: 3 | Status: SHIPPED | OUTPATIENT
Start: 2021-01-04 | End: 2022-01-05

## 2021-01-04 ASSESSMENT — PATIENT HEALTH QUESTIONNAIRE - PHQ9: SUM OF ALL RESPONSES TO PHQ QUESTIONS 1-9: 8

## 2021-01-04 ASSESSMENT — MIFFLIN-ST. JEOR: SCORE: 1784.36

## 2021-01-04 NOTE — PROGRESS NOTES
SUBJECTIVE:   CC: Rivera Paz is an 25 year old male who presents for preventative health visit.       Patient has been advised of split billing requirements and indicates understanding: Yes  Healthy Habits:     Getting at least 3 servings of Calcium per day:  Yes    Bi-annual eye exam:  NO    Dental care twice a year:  NO    Sleep apnea or symptoms of sleep apnea:  None    Diet:  Regular (no restrictions)    Duration of exercise:  Less than 15 minutes    Taking medications regularly:  Yes    Medication side effects:  None    PHQ-2 Total Score: 2      At last visit, did have mild proteinuria, plan was to follow up and recheck, he did do.  Asymptomatic.        Depression Followup    How are you doing with your depression since your last visit? Improved     Are you having other symptoms that might be associated with depression? No    Have you had a significant life event?  No     Are you feeling anxious or having panic attacks?   No    Do you have any concerns with your use of alcohol or other drugs? No    Social History     Tobacco Use     Smoking status: Never Smoker     Smokeless tobacco: Never Used     Tobacco comment: Vape   Substance Use Topics     Alcohol use: Yes     Drug use: Yes     Types: Marijuana     PHQ 6/18/2019 8/29/2019 1/4/2021   PHQ-9 Total Score 0 13 8   Q9: Thoughts of better off dead/self-harm past 2 weeks Not at all Several days Not at all   F/U: Thoughts of suicide or self-harm - No -   F/U: Safety concerns - No -     No flowsheet data found.  Last PHQ-9 1/4/2021   1.  Little interest or pleasure in doing things 1   2.  Feeling down, depressed, or hopeless 1   3.  Trouble falling or staying asleep, or sleeping too much 2   4.  Feeling tired or having little energy 2   5.  Poor appetite or overeating 0   6.  Feeling bad about yourself 1   7.  Trouble concentrating 1   8.  Moving slowly or restless 0   Q9: Thoughts of better off dead/self-harm past 2 weeks 0   PHQ-9 Total Score 8    Difficulty at work, home, or with people Somewhat difficult   In the past two weeks have you had thoughts of suicide or self harm? -   Do you have concerns about your personal safety or the safety of others? -         Suicide Assessment Five-step Evaluation and Treatment (SAFE-T)      Today's PHQ-2 Score:   PHQ-2 ( 1999 Pfizer) 12/28/2020   Q1: Little interest or pleasure in doing things 1   Q2: Feeling down, depressed or hopeless 1   PHQ-2 Score 2   Q1: Little interest or pleasure in doing things Several days   Q2: Feeling down, depressed or hopeless Several days   PHQ-2 Score 2       Abuse: Current or Past(Physical, Sexual or Emotional)- No  Do you feel safe in your environment? Yes        Social History     Tobacco Use     Smoking status: Never Smoker     Smokeless tobacco: Never Used     Tobacco comment: Vape   Substance Use Topics     Alcohol use: Yes     If you drink alcohol do you typically have >3 drinks per day or >7 drinks per week? No      AUDIT - Alcohol Use Disorders Identification Test - Reproduced from the World Health Organization Audit 2001 (Second Edition) 8/29/2019   1.  How often do you have a drink containing alcohol? 4 or more times a week   2.  How many drinks containing alcohol do you have on a typical day when you are drinking? 3 or 4   3.  How often do you have five or more drinks on one occasion? Monthly   4.  How often during the last year have you found that you were not able to stop drinking once you had started? Less than monthly   5.  How often during the last year have you failed to do what was normally expected of you because of drinking? Less than monthly   6.  How often during the last year have you needed a first drink in the morning to get yourself going after a heavy drinking session? Less than monthly   7.  How often during the last year have you had a feeling of guilt or remorse after drinking? Monthly   8.  How often during the last year have you been unable to remember  "what happened the night before because of your drinking? Less than monthly   9.  Have you or someone else been injured because of your drinking? No   10. Has a relative, friend, doctor or other health care worker been concerned about your drinking or suggested you cut down? Yes, during the last year   TOTAL SCORE 17       Last PSA: No results found for: PSA    Reviewed orders with patient. Reviewed health maintenance and updated orders accordingly - Yes  BP Readings from Last 3 Encounters:   01/04/21 133/87   08/29/19 119/82   02/14/19 117/83    Wt Readings from Last 3 Encounters:   01/04/21 75.3 kg (166 lb 1.6 oz)   08/29/19 71.9 kg (158 lb 9.6 oz)   02/14/19 70.5 kg (155 lb 6.4 oz)                    Reviewed and updated as needed this visit by clinical staff  Tobacco  Allergies  Meds              Reviewed and updated as needed this visit by Provider                    Review of Systems  CONSTITUTIONAL: NEGATIVE for fever, chills, change in weight  INTEGUMENTARY/SKIN: NEGATIVE for worrisome rashes, moles or lesions  EYES: NEGATIVE for vision changes or irritation  ENT: NEGATIVE for ear, mouth and throat problems  RESP: NEGATIVE for significant cough or SOB  CV: NEGATIVE for chest pain, palpitations or peripheral edema  GI: NEGATIVE for nausea, abdominal pain, heartburn, or change in bowel habits   male: negative for dysuria, hematuria, decreased urinary stream, erectile dysfunction, urethral discharge  MUSCULOSKELETAL: NEGATIVE for significant arthralgias or myalgia  NEURO: NEGATIVE for weakness, dizziness or paresthesias  PSYCHIATRIC: NEGATIVE for changes in mood or affect    OBJECTIVE:   /87   Pulse 100   Temp 97.7  F (36.5  C) (Tympanic)   Ht 1.842 m (6' 0.5\")   Wt 75.3 kg (166 lb 1.6 oz)   SpO2 94%   BMI 22.22 kg/m      Physical Exam  GENERAL: alert and no distress  EYES: Eyes grossly normal to inspection, PERRL and conjunctivae and sclerae normal  NECK: no adenopathy, no asymmetry, masses, or " scars and thyroid normal to palpation  RESP: lungs clear to auscultation - no rales, rhonchi or wheezes  CV: regular rate and rhythm, normal S1 S2, no S3 or S4, no murmur, click or rub, no peripheral edema and peripheral pulses strong  ABDOMEN: soft, nontender, no hepatosplenomegaly, no masses and bowel sounds normal  MS: no gross musculoskeletal defects noted, no edema  SKIN: no suspicious lesions or rashes  NEURO: Normal strength and tone, mentation intact and speech normal  PSYCH: mentation appears normal, affect normal/bright    Diagnostic Test Results:  Labs reviewed in Epic  Results for orders placed or performed in visit on 01/04/21 (from the past 24 hour(s))   CBC with platelets differential   Result Value Ref Range    WBC 5.4 4.0 - 11.0 10e9/L    RBC Count 4.48 4.4 - 5.9 10e12/L    Hemoglobin 14.8 13.3 - 17.7 g/dL    Hematocrit 43.3 40.0 - 53.0 %    MCV 97 78 - 100 fl    MCH 33.0 26.5 - 33.0 pg    MCHC 34.2 31.5 - 36.5 g/dL    RDW 11.9 10.0 - 15.0 %    Platelet Count 180 150 - 450 10e9/L    % Neutrophils 46.8 %    % Lymphocytes 40.7 %    % Monocytes 9.9 %    % Eosinophils 2.2 %    % Basophils 0.4 %    Absolute Neutrophil 2.5 1.6 - 8.3 10e9/L    Absolute Lymphocytes 2.2 0.8 - 5.3 10e9/L    Absolute Monocytes 0.5 0.0 - 1.3 10e9/L    Absolute Eosinophils 0.1 0.0 - 0.7 10e9/L    Absolute Basophils 0.0 0.0 - 0.2 10e9/L    Diff Method Automated Method    UA with Microscopic reflex to Culture    Specimen: Midstream Urine   Result Value Ref Range    Color Urine Yellow     Appearance Urine Clear     Glucose Urine Negative NEG^Negative mg/dL    Bilirubin Urine Small (A) NEG^Negative    Ketones Urine Trace (A) NEG^Negative mg/dL    Specific Gravity Urine 1.025 1.003 - 1.035    pH Urine 7.0 5.0 - 7.0 pH    Protein Albumin Urine Negative NEG^Negative mg/dL    Urobilinogen Urine 0.2 0.2 - 1.0 EU/dL    Nitrite Urine Negative NEG^Negative    Blood Urine Negative NEG^Negative    Leukocyte Esterase Urine Negative  "NEG^Negative    Source Midstream Urine     WBC Urine 0 - 5 OTO5^0 - 5 /HPF    RBC Urine O - 2 OTO2^O - 2 /HPF       ASSESSMENT/PLAN:   1. Routine general medical examination at a health care facility    - CBC with platelets differential  - Comprehensive metabolic panel    2. Moderate episode of recurrent major depressive disorder (H)  Improved, feels meds working well.  Does not request adjustment.  - buPROPion (WELLBUTRIN XL) 150 MG 24 hr tablet; Take 1 tablet (150 mg) by mouth every morning  Dispense: 90 tablet; Refill: 3  - escitalopram (LEXAPRO) 20 MG tablet; Take 1 tablet (20 mg) by mouth daily  Dispense: 90 tablet; Refill: 3    3. Proteinuria, unspecified type  Resolved on UA, will get baseline labs  - CBC with platelets differential  - Comprehensive metabolic panel  - UA with Microscopic reflex to Culture    4. Need for vaccination    - INFLUENZA VACCINE IM > 6 MONTHS VALENT IIV4 [47881]  - ADMIN 1st VACCINE    Patient has been advised of split billing requirements and indicates understanding: Yes  COUNSELING:   Reviewed preventive health counseling, as reflected in patient instructions       Regular exercise       Healthy diet/nutrition       Immunizations    Vaccinated for: Influenza          Estimated body mass index is 22.22 kg/m  as calculated from the following:    Height as of this encounter: 1.842 m (6' 0.5\").    Weight as of this encounter: 75.3 kg (166 lb 1.6 oz).         He reports that he has never smoked. He has never used smokeless tobacco.      Counseling Resources:  ATP IV Guidelines  Pooled Cohorts Equation Calculator  FRAX Risk Assessment  ICSI Preventive Guidelines  Dietary Guidelines for Americans, 2010  USDA's MyPlate  ASA Prophylaxis  Lung CA Screening    Leeroy Ewing PA-C  [facility]  "

## 2021-01-05 LAB
ALBUMIN SERPL-MCNC: 4.2 G/DL (ref 3.4–5)
ALP SERPL-CCNC: 66 U/L (ref 40–150)
ALT SERPL W P-5'-P-CCNC: 28 U/L (ref 0–70)
ANION GAP SERPL CALCULATED.3IONS-SCNC: <1 MMOL/L (ref 3–14)
AST SERPL W P-5'-P-CCNC: 25 U/L (ref 0–45)
BILIRUB SERPL-MCNC: 0.3 MG/DL (ref 0.2–1.3)
BUN SERPL-MCNC: 10 MG/DL (ref 7–30)
CALCIUM SERPL-MCNC: 8.7 MG/DL (ref 8.5–10.1)
CHLORIDE SERPL-SCNC: 107 MMOL/L (ref 94–109)
CO2 SERPL-SCNC: 30 MMOL/L (ref 20–32)
CREAT SERPL-MCNC: 0.89 MG/DL (ref 0.66–1.25)
GFR SERPL CREATININE-BSD FRML MDRD: >90 ML/MIN/{1.73_M2}
GLUCOSE SERPL-MCNC: 92 MG/DL (ref 70–99)
POTASSIUM SERPL-SCNC: 4.1 MMOL/L (ref 3.4–5.3)
PROT SERPL-MCNC: 7.2 G/DL (ref 6.8–8.8)
SODIUM SERPL-SCNC: 137 MMOL/L (ref 133–144)

## 2021-01-05 NOTE — RESULT ENCOUNTER NOTE
Dear Rivera    Your test results are attached, feel free to contact me via [facility]     Everything looks just fine on your labs.  Keep up the good work.    Darryl Ewing PA-C

## 2021-10-10 ENCOUNTER — HEALTH MAINTENANCE LETTER (OUTPATIENT)
Age: 26
End: 2021-10-10

## 2022-02-26 DIAGNOSIS — F33.1 MODERATE EPISODE OF RECURRENT MAJOR DEPRESSIVE DISORDER (H): ICD-10-CM

## 2022-03-03 RX ORDER — BUPROPION HYDROCHLORIDE 150 MG/1
TABLET ORAL
Qty: 30 TABLET | Refills: 0 | Status: SHIPPED | OUTPATIENT
Start: 2022-03-03 | End: 2022-04-21

## 2022-03-03 NOTE — TELEPHONE ENCOUNTER
JS,  Left  #2 for patient  See below messages  No response from patient to our outreach  Please advise on further refill  Thanks,  Lizeth GATES RN

## 2022-03-26 ENCOUNTER — HEALTH MAINTENANCE LETTER (OUTPATIENT)
Age: 27
End: 2022-03-26

## 2022-04-19 NOTE — PROGRESS NOTES
SUBJECTIVE:   CC: Rivera Paz is an 26 year old male who presents for preventative health visit.         Healthy Habits:     Getting at least 3 servings of Calcium per day:  Yes    Bi-annual eye exam:  Yes    Dental care twice a year:  Yes    Sleep apnea or symptoms of sleep apnea:  Daytime drowsiness    Diet:  Regular (no restrictions)    Frequency of exercise:  2-3 days/week    Duration of exercise:  15-30 minutes    Taking medications regularly:  Yes    Medication side effects:  None    PHQ-2 Total Score: 4    Additional concerns today:  No    Answers for HPI/ROS submitted by the patient on 4/21/2022  If you checked off any problems, how difficult have these problems made it for you to do your work, take care of things at home, or get along with other people?: Very difficult  PHQ9 TOTAL SCORE: 13      He has been working with therapist who he feels has really been helpful.  Despite his high PHQ9, he does feel like things are going ok and does not request change in meds.        Today's PHQ-2 Score:   PHQ-2 ( 1999 [employer]) 4/21/2022   Q1: Little interest or pleasure in doing things 2   Q2: Feeling down, depressed or hopeless 2   PHQ-2 Score 4   PHQ-2 Total Score (12-17 Years)- Positive if 3 or more points; Administer PHQ-A if positive -   Q1: Little interest or pleasure in doing things More than half the days   Q2: Feeling down, depressed or hopeless More than half the days   PHQ-2 Score 4       Abuse: Current or Past(Physical, Sexual or Emotional)- No  Do you feel safe in your environment? Yes    Have you ever done Advance Care Planning? (For example, a Health Directive, POLST, or a discussion with a medical provider or your loved ones about your wishes): No, advance care planning information given to patient to review.  Patient declined advance care planning discussion at this time.    Social History     Tobacco Use     Smoking status: Never Smoker     Smokeless tobacco: Never Used     Tobacco comment:  Vape   Substance Use Topics     Alcohol use: Yes     If you drink alcohol do you typically have >3 drinks per day or >7 drinks per week? YES    Alcohol Use 4/21/2022   Prescreen: >3 drinks/day or >7 drinks/week? Yes   Prescreen: >3 drinks/day or >7 drinks/week? -   AUDIT SCORE  15     AUDIT - Alcohol Use Disorders Identification Test - Reproduced from the World Health Organization Audit 2001 (Second Edition) 4/21/2022   1.  How often do you have a drink containing alcohol? 4 or more times a week   2.  How many drinks containing alcohol do you have on a typical day when you are drinking? 3 or 4   3.  How often do you have five or more drinks on one occasion? Monthly   4.  How often during the last year have you found that you were not able to stop drinking once you had started? Monthly   5.  How often during the last year have you failed to do what was normally expected of you because of drinking? Less than monthly   6.  How often during the last year have you needed a first drink in the morning to get yourself going after a heavy drinking session? Never   7.  How often during the last year have you had a feeling of guilt or remorse after drinking? Monthly   8.  How often during the last year have you been unable to remember what happened the night before because of your drinking? Less than monthly   9.  Have you or someone else been injured because of your drinking? No   10. Has a relative, friend, doctor or other health care worker been concerned about your drinking or suggested you cut down? Yes, but not in the last year   TOTAL SCORE 15       Last PSA: No results found for: PSA    Reviewed orders with patient. Reviewed health maintenance and updated orders accordingly - Yes  BP Readings from Last 3 Encounters:   01/04/21 133/87   08/29/19 119/82   02/14/19 117/83    Wt Readings from Last 3 Encounters:   04/21/22 80 kg (176 lb 4.8 oz)   01/04/21 75.3 kg (166 lb 1.6 oz)   08/29/19 71.9 kg (158 lb 9.6 oz)           "          Reviewed and updated as needed this visit by clinical staff   Tobacco  Allergies    Med Hx  Surg Hx  Fam Hx  Soc Hx          Reviewed and updated as needed this visit by Provider                       Review of Systems   Constitutional: Negative for chills and fever.   HENT: Negative for congestion, ear pain, hearing loss and sore throat.    Eyes: Negative for pain and visual disturbance.   Respiratory: Negative for cough and shortness of breath.    Cardiovascular: Negative for chest pain, palpitations and peripheral edema.   Gastrointestinal: Positive for heartburn. Negative for abdominal pain, constipation, diarrhea, hematochezia and nausea.   Genitourinary: Negative for dysuria, frequency, genital sores, hematuria and urgency.   Musculoskeletal: Negative for arthralgias, joint swelling and myalgias.   Skin: Negative for rash.   Neurological: Negative for dizziness, weakness, headaches and paresthesias.   Psychiatric/Behavioral: Positive for mood changes. The patient is nervous/anxious.          OBJECTIVE:   Pulse 76   Temp 97  F (36.1  C) (Temporal)   Ht 1.844 m (6' 0.6\")   Wt 80 kg (176 lb 4.8 oz)   SpO2 96%   BMI 23.52 kg/m      Physical Exam  GENERAL: alert and no distress  EYES: Eyes grossly normal to inspection, PERRL and conjunctivae and sclerae normal  HENT: ear canals and TM's normal, nose and mouth without ulcers or lesions  NECK: no adenopathy, no asymmetry, masses, or scars and thyroid normal to palpation  RESP: lungs clear to auscultation - no rales, rhonchi or wheezes  CV: regular rate and rhythm, normal S1 S2, no S3 or S4, no murmur, click or rub, no peripheral edema and peripheral pulses strong  ABDOMEN: soft, nontender, no hepatosplenomegaly, no masses and bowel sounds normal  MS: no gross musculoskeletal defects noted, no edema  SKIN: no suspicious lesions or rashes  NEURO: Normal strength and tone, mentation intact and speech normal  PSYCH: mentation appears normal, affect " "normal/bright    Diagnostic Test Results:  Labs reviewed in Epic    ASSESSMENT/PLAN:   (Z00.00) Routine general medical examination at a health care facility  (primary encounter diagnosis)  Comment:   Plan:     (F33.1) Moderate episode of recurrent major depressive disorder (H)  Comment: discussed his PHQ9 and suicide thoughts.  He does feel fleeting, and no anything he would act upon.  Feels safe at home and has weekly counseling sessions.  Plan: buPROPion (WELLBUTRIN XL) 150 MG 24 hr tablet,         escitalopram (LEXAPRO) 20 MG tablet            (Z23) Need for vaccination  Comment:   Plan: HPV, IM (9 - 26 YRS) - Gardasil 9                  COUNSELING:   Reviewed preventive health counseling, as reflected in patient instructions       Regular exercise       Healthy diet/nutrition       Immunizations    Vaccinated for: Human Papillomavirus          Estimated body mass index is 23.52 kg/m  as calculated from the following:    Height as of this encounter: 1.844 m (6' 0.6\").    Weight as of this encounter: 80 kg (176 lb 4.8 oz).         He reports that he has never smoked. He has never used smokeless tobacco.      Counseling Resources:  ATP IV Guidelines  Pooled Cohorts Equation Calculator  FRAX Risk Assessment  ICSI Preventive Guidelines  Dietary Guidelines for Americans, 2010  USDA's MyPlate  ASA Prophylaxis  Lung CA Screening    Leeroy Ewing PA-C  [facility] [place]  "

## 2022-04-21 ENCOUNTER — OFFICE VISIT (OUTPATIENT)
Dept: FAMILY MEDICINE | Facility: CLINIC | Age: 27
End: 2022-04-21
Payer: COMMERCIAL

## 2022-04-21 VITALS
TEMPERATURE: 97 F | HEIGHT: 73 IN | HEART RATE: 76 BPM | BODY MASS INDEX: 23.37 KG/M2 | OXYGEN SATURATION: 96 % | WEIGHT: 176.3 LBS

## 2022-04-21 DIAGNOSIS — Z23 NEED FOR VACCINATION: ICD-10-CM

## 2022-04-21 DIAGNOSIS — Z00.00 ROUTINE GENERAL MEDICAL EXAMINATION AT A HEALTH CARE FACILITY: Primary | ICD-10-CM

## 2022-04-21 DIAGNOSIS — F33.1 MODERATE EPISODE OF RECURRENT MAJOR DEPRESSIVE DISORDER (H): ICD-10-CM

## 2022-04-21 PROCEDURE — 90471 IMMUNIZATION ADMIN: CPT | Performed by: PHYSICIAN ASSISTANT

## 2022-04-21 PROCEDURE — 99395 PREV VISIT EST AGE 18-39: CPT | Mod: 25 | Performed by: PHYSICIAN ASSISTANT

## 2022-04-21 PROCEDURE — 90651 9VHPV VACCINE 2/3 DOSE IM: CPT | Performed by: PHYSICIAN ASSISTANT

## 2022-04-21 RX ORDER — ESCITALOPRAM OXALATE 20 MG/1
TABLET ORAL
Qty: 90 TABLET | Refills: 3 | Status: SHIPPED | OUTPATIENT
Start: 2022-04-21 | End: 2023-04-25

## 2022-04-21 RX ORDER — BUPROPION HYDROCHLORIDE 150 MG/1
TABLET ORAL
Qty: 90 TABLET | Refills: 3 | Status: SHIPPED | OUTPATIENT
Start: 2022-04-21 | End: 2023-04-25

## 2022-04-21 ASSESSMENT — ENCOUNTER SYMPTOMS
CONSTIPATION: 0
HEMATOCHEZIA: 0
NAUSEA: 0
JOINT SWELLING: 0
SORE THROAT: 0
HEADACHES: 0
NERVOUS/ANXIOUS: 1
EYE PAIN: 0
HEARTBURN: 1
ARTHRALGIAS: 0
FEVER: 0
DIARRHEA: 0
ABDOMINAL PAIN: 0
PARESTHESIAS: 0
HEMATURIA: 0
SHORTNESS OF BREATH: 0
COUGH: 0
FREQUENCY: 0
WEAKNESS: 0
DYSURIA: 0
DIZZINESS: 0
MYALGIAS: 0
PALPITATIONS: 0
CHILLS: 0

## 2022-04-21 ASSESSMENT — PATIENT HEALTH QUESTIONNAIRE - PHQ9
SUM OF ALL RESPONSES TO PHQ QUESTIONS 1-9: 13
10. IF YOU CHECKED OFF ANY PROBLEMS, HOW DIFFICULT HAVE THESE PROBLEMS MADE IT FOR YOU TO DO YOUR WORK, TAKE CARE OF THINGS AT HOME, OR GET ALONG WITH OTHER PEOPLE: VERY DIFFICULT
SUM OF ALL RESPONSES TO PHQ QUESTIONS 1-9: 13

## 2022-09-18 ENCOUNTER — HEALTH MAINTENANCE LETTER (OUTPATIENT)
Age: 27
End: 2022-09-18

## 2023-04-20 ENCOUNTER — PATIENT OUTREACH (OUTPATIENT)
Dept: CARE COORDINATION | Facility: CLINIC | Age: 28
End: 2023-04-20
Payer: COMMERCIAL

## 2023-04-25 ENCOUNTER — VIRTUAL VISIT (OUTPATIENT)
Dept: FAMILY MEDICINE | Facility: CLINIC | Age: 28
End: 2023-04-25
Payer: COMMERCIAL

## 2023-04-25 DIAGNOSIS — F33.1 MODERATE EPISODE OF RECURRENT MAJOR DEPRESSIVE DISORDER (H): ICD-10-CM

## 2023-04-25 PROCEDURE — 99213 OFFICE O/P EST LOW 20 MIN: CPT | Mod: VID | Performed by: PHYSICIAN ASSISTANT

## 2023-04-25 RX ORDER — BUPROPION HYDROCHLORIDE 150 MG/1
TABLET ORAL
Qty: 90 TABLET | Refills: 3 | Status: SHIPPED | OUTPATIENT
Start: 2023-04-25 | End: 2024-04-04

## 2023-04-25 RX ORDER — ESCITALOPRAM OXALATE 20 MG/1
TABLET ORAL
Qty: 90 TABLET | Refills: 3 | Status: SHIPPED | OUTPATIENT
Start: 2023-04-25 | End: 2024-04-04

## 2023-04-25 NOTE — PROGRESS NOTES
Rivera is a 27 year old who is being evaluated via a billable video visit.      How would you like to obtain your AVS? MyChart  If the video visit is dropped, the invitation should be resent by: Send to e-mail at: [email or web address]  Will anyone else be joining your video visit? No        Assessment & Plan     Moderate episode of recurrent major depressive disorder (H)  Doing well, no active concerns.  Good support system and good relationship with therapist.  Will continue meds  - buPROPion (WELLBUTRIN XL) 150 MG 24 hr tablet; TAKE 1 TABLET BY MOUTH EVERY MORNING.; PATIENT DUE FOR APPOINTMENT(PHYSICAL)  - escitalopram (LEXAPRO) 20 MG tablet; TAKE 1 TABLET BY MOUTH DAILY( PATIENT DUE FOR APPOITMENT)                 EVELYN Henderson [facility]   Rivera is a 27 year old, presenting for the following health issues:  Recheck Medication and MH Follow Up (Depression)         View : No data to display.              HPI     Depression Followup    How are you doing with your depression since your last visit? Improved     Are you having other symptoms that might be associated with depression? No    Have you had a significant life event?  No     Are you feeling anxious or having panic attacks?   Yes:  some, better lately    Do you have any concerns with your use of alcohol or other drugs? No    Social History     Tobacco Use     Smoking status: Never     Smokeless tobacco: Never     Tobacco comments:     Vape   Vaping Use     Vaping status: Former   Substance Use Topics     Alcohol use: Yes     Drug use: Yes     Types: Marijuana         8/29/2019     2:06 PM 1/4/2021    12:00 PM 4/21/2022     1:09 PM   PHQ   PHQ-9 Total Score 13 8 13   Q9: Thoughts of better off dead/self-harm past 2 weeks Several days Not at all Several days   F/U: Thoughts of suicide or self-harm No  No   F/U: Safety concerns No  No          View : No data to display.                  Suicide Assessment  Five-step Evaluation and Treatment (SAFE-T)    Works with therapist twice a week and finds this very helpful.  Continues to feel the meds are a good tool and wishes to continue      Review of Systems   Constitutional, HEENT, cardiovascular, pulmonary, gi and gu systems are negative, except as otherwise noted.      Objective           Vitals:  No vitals were obtained today due to virtual visit.    Physical Exam   GENERAL: Healthy, alert and no distress  EYES: Eyes grossly normal to inspection.  No discharge or erythema, or obvious scleral/conjunctival abnormalities.  RESP: No audible wheeze, cough, or visible cyanosis.  No visible retractions or increased work of breathing.    SKIN: Visible skin clear. No significant rash, abnormal pigmentation or lesions.  NEURO: Cranial nerves grossly intact.  Mentation and speech appropriate for age.  PSYCH: Mentation appears normal, affect normal/bright, judgement and insight intact, normal speech and appearance well-groomed.                Video-Visit Details    Type of service:  Video Visit   Video Start Time: 1:33 PM  Video End Time:1:40 PM    Originating Location (pt. Location): Home  Distant Location (provider location):  On-site  Platform used for Video Visit: Rossy

## 2023-06-04 ENCOUNTER — HEALTH MAINTENANCE LETTER (OUTPATIENT)
Age: 28
End: 2023-06-04

## 2024-04-04 ENCOUNTER — VIRTUAL VISIT (OUTPATIENT)
Dept: FAMILY MEDICINE | Facility: CLINIC | Age: 29
End: 2024-04-04
Payer: COMMERCIAL

## 2024-04-04 DIAGNOSIS — R41.840 INATTENTION: ICD-10-CM

## 2024-04-04 DIAGNOSIS — F33.1 MODERATE EPISODE OF RECURRENT MAJOR DEPRESSIVE DISORDER (H): Primary | ICD-10-CM

## 2024-04-04 PROCEDURE — 99213 OFFICE O/P EST LOW 20 MIN: CPT | Mod: 95 | Performed by: PHYSICIAN ASSISTANT

## 2024-04-04 RX ORDER — ESCITALOPRAM OXALATE 20 MG/1
TABLET ORAL
Qty: 90 TABLET | Refills: 3 | Status: SHIPPED | OUTPATIENT
Start: 2024-04-04

## 2024-04-04 RX ORDER — BUPROPION HYDROCHLORIDE 150 MG/1
TABLET ORAL
Qty: 90 TABLET | Refills: 3 | Status: SHIPPED | OUTPATIENT
Start: 2024-04-04 | End: 2024-05-02 | Stop reason: ALTCHOICE

## 2024-04-04 ASSESSMENT — PATIENT HEALTH QUESTIONNAIRE - PHQ9
5. POOR APPETITE OR OVEREATING: NEARLY EVERY DAY
SUM OF ALL RESPONSES TO PHQ QUESTIONS 1-9: 20

## 2024-04-04 ASSESSMENT — ANXIETY QUESTIONNAIRES
IF YOU CHECKED OFF ANY PROBLEMS ON THIS QUESTIONNAIRE, HOW DIFFICULT HAVE THESE PROBLEMS MADE IT FOR YOU TO DO YOUR WORK, TAKE CARE OF THINGS AT HOME, OR GET ALONG WITH OTHER PEOPLE: EXTREMELY DIFFICULT
2. NOT BEING ABLE TO STOP OR CONTROL WORRYING: NEARLY EVERY DAY
GAD7 TOTAL SCORE: 16
7. FEELING AFRAID AS IF SOMETHING AWFUL MIGHT HAPPEN: SEVERAL DAYS
6. BECOMING EASILY ANNOYED OR IRRITABLE: MORE THAN HALF THE DAYS
1. FEELING NERVOUS, ANXIOUS, OR ON EDGE: NEARLY EVERY DAY
3. WORRYING TOO MUCH ABOUT DIFFERENT THINGS: NEARLY EVERY DAY
5. BEING SO RESTLESS THAT IT IS HARD TO SIT STILL: SEVERAL DAYS
GAD7 TOTAL SCORE: 16

## 2024-04-04 NOTE — PROGRESS NOTES
Rivera is a 28 year old who is being evaluated via a billable video visit.    How would you like to obtain your AVS? MyChart  If the video visit is dropped, the invitation should be resent by: Text to cell phone: [phone]  Will anyone else be joining your video visit? No      Assessment & Plan     Moderate episode of recurrent major depressive disorder (H)  Interested in working with psychiatry to determine next steps in possible treatment/evaluation.  Will continue meds for now, as he does feel they are helpful  - Adult Mental Health  Referral; Future  - escitalopram (LEXAPRO) 20 MG tablet; TAKE 1 TABLET BY MOUTH DAILY( PATIENT DUE FOR APPOITMENT)  - buPROPion (WELLBUTRIN XL) 150 MG 24 hr tablet; TAKE 1 TABLET BY MOUTH EVERY MORNING.; PATIENT DUE FOR APPOINTMENT(PHYSICAL)    Inattention    - Adult Mental Health  Referral; Future            Depression Screening Follow Up                  Follow Up Actions Taken  Crisis resource information provided in the After Visit Summary  Mental Health Referral placed    Discussed the following ways the patient can remain in a safe environment:  be around others        Subjective   Rivera is a 28 year old, presenting for the following health issues:  No chief complaint on file.        4/4/2024    12:06 PM   Additional Questions   Roomed by [email or web address]     Video Start Time: 1207    HPI   ADHD  Onset: since childhood   Description:   Easily distracted: YES  Short attention span: YES  Trouble following directions: YES   Impulsive behavior: YES   Trouble completing tasks: YES  Accompanying Signs & Symptoms:        Change in sleep pattern: Yes -   Irritability at certain times of the day: YES  Socially withdrawn: YES  Depression symptoms: YES  Anxiety symptoms: YES  History:  Caffeine intake: Moderate  Loss of appetite: Yes - side effect from Concerta   Healthy diet: No  Did you have problems in school or with previous employment: YES  Family history of ADHD: No  Have  you had an evaluation for ADHD in the past: YES  Do you use alcohol or drugs: No  Therapies tried: Adderall (concerta) with no relief       1/4/2021    12:00 PM 4/21/2022     1:09 PM 4/4/2024    11:59 AM   PHQ   PHQ-9 Total Score 8 13 20   Q9: Thoughts of better off dead/self-harm past 2 weeks Not at all Several days More than half the days   F/U: Thoughts of suicide or self-harm  No    F/U: Safety concerns  No           4/4/2024    11:59 AM   LUIS MIGUEL-7 SCORE   Total Score 16                Review of Systems  Constitutional, HEENT, cardiovascular, pulmonary, gi and gu systems are negative, except as otherwise noted.      Objective           Vitals:  No vitals were obtained today due to virtual visit.    Physical Exam   GENERAL: alert and no distress  EYES: Eyes grossly normal to inspection.  No discharge or erythema, or obvious scleral/conjunctival abnormalities.  RESP: No audible wheeze, cough, or visible cyanosis.    SKIN: Visible skin clear. No significant rash, abnormal pigmentation or lesions.  NEURO: Cranial nerves grossly intact.  Mentation and speech appropriate for age.  PSYCH: Appropriate affect, tone, and pace of words          Video-Visit Details    Type of service:  Video Visit   Video End Time:1220  Originating Location (pt. Location): Home    Distant Location (provider location):  On-site  Platform used for Video Visit: Rossy  Signed Electronically by: Leeroy Ewing PA-C

## 2024-04-12 ENCOUNTER — OFFICE VISIT (OUTPATIENT)
Dept: FAMILY MEDICINE | Facility: CLINIC | Age: 29
End: 2024-04-12
Payer: COMMERCIAL

## 2024-04-12 VITALS
SYSTOLIC BLOOD PRESSURE: 122 MMHG | DIASTOLIC BLOOD PRESSURE: 81 MMHG | HEIGHT: 73 IN | BODY MASS INDEX: 23.95 KG/M2 | RESPIRATION RATE: 16 BRPM | WEIGHT: 180.7 LBS | HEART RATE: 86 BPM | TEMPERATURE: 98.1 F | OXYGEN SATURATION: 97 %

## 2024-04-12 DIAGNOSIS — Z00.00 ROUTINE GENERAL MEDICAL EXAMINATION AT A HEALTH CARE FACILITY: Primary | ICD-10-CM

## 2024-04-12 DIAGNOSIS — F33.1 MODERATE EPISODE OF RECURRENT MAJOR DEPRESSIVE DISORDER (H): ICD-10-CM

## 2024-04-12 DIAGNOSIS — Z13.6 CARDIOVASCULAR SCREENING; LDL GOAL LESS THAN 160: ICD-10-CM

## 2024-04-12 LAB
ALBUMIN SERPL BCG-MCNC: 4.7 G/DL (ref 3.5–5.2)
ALP SERPL-CCNC: 62 U/L (ref 40–150)
ALT SERPL W P-5'-P-CCNC: 21 U/L (ref 0–70)
ANION GAP SERPL CALCULATED.3IONS-SCNC: 11 MMOL/L (ref 7–15)
AST SERPL W P-5'-P-CCNC: 31 U/L (ref 0–45)
BASOPHILS # BLD AUTO: 0 10E3/UL (ref 0–0.2)
BASOPHILS NFR BLD AUTO: 0 %
BILIRUB SERPL-MCNC: 0.3 MG/DL
BUN SERPL-MCNC: 10.4 MG/DL (ref 6–20)
CALCIUM SERPL-MCNC: 9.5 MG/DL (ref 8.6–10)
CHLORIDE SERPL-SCNC: 103 MMOL/L (ref 98–107)
CHOLEST SERPL-MCNC: 223 MG/DL
CREAT SERPL-MCNC: 0.92 MG/DL (ref 0.67–1.17)
DEPRECATED HCO3 PLAS-SCNC: 24 MMOL/L (ref 22–29)
EGFRCR SERPLBLD CKD-EPI 2021: >90 ML/MIN/1.73M2
EOSINOPHIL # BLD AUTO: 0.1 10E3/UL (ref 0–0.7)
EOSINOPHIL NFR BLD AUTO: 2 %
ERYTHROCYTE [DISTWIDTH] IN BLOOD BY AUTOMATED COUNT: 12.1 % (ref 10–15)
FASTING STATUS PATIENT QL REPORTED: NO
GLUCOSE SERPL-MCNC: 103 MG/DL (ref 70–99)
HCT VFR BLD AUTO: 43.8 % (ref 40–53)
HDLC SERPL-MCNC: 51 MG/DL
HGB BLD-MCNC: 15.2 G/DL (ref 13.3–17.7)
IMM GRANULOCYTES # BLD: 0 10E3/UL
IMM GRANULOCYTES NFR BLD: 0 %
LDLC SERPL CALC-MCNC: 149 MG/DL
LYMPHOCYTES # BLD AUTO: 2.5 10E3/UL (ref 0.8–5.3)
LYMPHOCYTES NFR BLD AUTO: 37 %
MCH RBC QN AUTO: 33.2 PG (ref 26.5–33)
MCHC RBC AUTO-ENTMCNC: 34.7 G/DL (ref 31.5–36.5)
MCV RBC AUTO: 96 FL (ref 78–100)
MONOCYTES # BLD AUTO: 0.5 10E3/UL (ref 0–1.3)
MONOCYTES NFR BLD AUTO: 7 %
NEUTROPHILS # BLD AUTO: 3.6 10E3/UL (ref 1.6–8.3)
NEUTROPHILS NFR BLD AUTO: 53 %
NONHDLC SERPL-MCNC: 172 MG/DL
PLATELET # BLD AUTO: 194 10E3/UL (ref 150–450)
POTASSIUM SERPL-SCNC: 4.1 MMOL/L (ref 3.4–5.3)
PROT SERPL-MCNC: 7.2 G/DL (ref 6.4–8.3)
RBC # BLD AUTO: 4.58 10E6/UL (ref 4.4–5.9)
SODIUM SERPL-SCNC: 138 MMOL/L (ref 135–145)
TRIGL SERPL-MCNC: 113 MG/DL
TSH SERPL DL<=0.005 MIU/L-ACNC: 1.17 UIU/ML (ref 0.3–4.2)
VIT D+METAB SERPL-MCNC: 46 NG/ML (ref 20–50)
WBC # BLD AUTO: 6.8 10E3/UL (ref 4–11)

## 2024-04-12 PROCEDURE — 80061 LIPID PANEL: CPT | Performed by: PHYSICIAN ASSISTANT

## 2024-04-12 PROCEDURE — 99395 PREV VISIT EST AGE 18-39: CPT | Performed by: PHYSICIAN ASSISTANT

## 2024-04-12 PROCEDURE — 82306 VITAMIN D 25 HYDROXY: CPT | Performed by: PHYSICIAN ASSISTANT

## 2024-04-12 PROCEDURE — 84443 ASSAY THYROID STIM HORMONE: CPT | Performed by: PHYSICIAN ASSISTANT

## 2024-04-12 PROCEDURE — 85025 COMPLETE CBC W/AUTO DIFF WBC: CPT | Performed by: PHYSICIAN ASSISTANT

## 2024-04-12 PROCEDURE — 36415 COLL VENOUS BLD VENIPUNCTURE: CPT | Performed by: PHYSICIAN ASSISTANT

## 2024-04-12 PROCEDURE — 80053 COMPREHEN METABOLIC PANEL: CPT | Performed by: PHYSICIAN ASSISTANT

## 2024-04-12 SDOH — HEALTH STABILITY: PHYSICAL HEALTH: ON AVERAGE, HOW MANY DAYS PER WEEK DO YOU ENGAGE IN MODERATE TO STRENUOUS EXERCISE (LIKE A BRISK WALK)?: 3 DAYS

## 2024-04-12 SDOH — HEALTH STABILITY: PHYSICAL HEALTH: ON AVERAGE, HOW MANY MINUTES DO YOU ENGAGE IN EXERCISE AT THIS LEVEL?: 60 MIN

## 2024-04-12 ASSESSMENT — ANXIETY QUESTIONNAIRES
1. FEELING NERVOUS, ANXIOUS, OR ON EDGE: MORE THAN HALF THE DAYS
8. IF YOU CHECKED OFF ANY PROBLEMS, HOW DIFFICULT HAVE THESE MADE IT FOR YOU TO DO YOUR WORK, TAKE CARE OF THINGS AT HOME, OR GET ALONG WITH OTHER PEOPLE?: VERY DIFFICULT
GAD7 TOTAL SCORE: 14
IF YOU CHECKED OFF ANY PROBLEMS ON THIS QUESTIONNAIRE, HOW DIFFICULT HAVE THESE PROBLEMS MADE IT FOR YOU TO DO YOUR WORK, TAKE CARE OF THINGS AT HOME, OR GET ALONG WITH OTHER PEOPLE: VERY DIFFICULT
2. NOT BEING ABLE TO STOP OR CONTROL WORRYING: MORE THAN HALF THE DAYS
7. FEELING AFRAID AS IF SOMETHING AWFUL MIGHT HAPPEN: MORE THAN HALF THE DAYS
6. BECOMING EASILY ANNOYED OR IRRITABLE: MORE THAN HALF THE DAYS
7. FEELING AFRAID AS IF SOMETHING AWFUL MIGHT HAPPEN: MORE THAN HALF THE DAYS
GAD7 TOTAL SCORE: 14
3. WORRYING TOO MUCH ABOUT DIFFERENT THINGS: MORE THAN HALF THE DAYS
GAD7 TOTAL SCORE: 14
5. BEING SO RESTLESS THAT IT IS HARD TO SIT STILL: MORE THAN HALF THE DAYS
4. TROUBLE RELAXING: MORE THAN HALF THE DAYS

## 2024-04-12 ASSESSMENT — PAIN SCALES - GENERAL: PAINLEVEL: NO PAIN (0)

## 2024-04-12 ASSESSMENT — PATIENT HEALTH QUESTIONNAIRE - PHQ9
10. IF YOU CHECKED OFF ANY PROBLEMS, HOW DIFFICULT HAVE THESE PROBLEMS MADE IT FOR YOU TO DO YOUR WORK, TAKE CARE OF THINGS AT HOME, OR GET ALONG WITH OTHER PEOPLE: VERY DIFFICULT
SUM OF ALL RESPONSES TO PHQ QUESTIONS 1-9: 18
SUM OF ALL RESPONSES TO PHQ QUESTIONS 1-9: 18

## 2024-04-12 NOTE — PATIENT INSTRUCTIONS
Preventive Care Advice   This is general advice given by our system to help you stay healthy. However, your care team may have specific advice just for you. Please talk to your care team about your preventive care needs.  Nutrition  Eat 5 or more servings of fruits and vegetables each day.  Try wheat bread, brown rice and whole grain pasta (instead of white bread, rice, and pasta).  Get enough calcium and vitamin D. Check the label on foods and aim for 100% of the RDA (recommended daily allowance).  Lifestyle  Exercise at least 150 minutes each week   (30 minutes a day, 5 days a week).  Do muscle strengthening activities 2 days a week. These help control your weight and prevent disease.  No smoking.  Wear sunscreen to prevent skin cancer.  Have a dental exam and cleaning every 6 months.  Yearly exams  See your health care team every year to talk about:  Any changes in your health.  Any medicines your care team has prescribed.  Preventive care, family planning, and ways to prevent chronic diseases.  Shots (vaccines)   HPV shots (up to age 26), if you've never had them before.  Hepatitis B shots (up to age 59), if you've never had them before.  COVID-19 shot: Get this shot when it's due.  Flu shot: Get a flu shot every year.  Tetanus shot: Get a tetanus shot every 10 years.  Pneumococcal, hepatitis A, and RSV shots: Ask your care team if you need these based on your risk.  Shingles shot (for age 50 and up).  General health tests  Diabetes screening:  Starting at age 35, Get screened for diabetes at least every 3 years.  If you are younger than age 35, ask your care team if you should be screened for diabetes.  Cholesterol test: At age 39, start having a cholesterol test every 5 years, or more often if advised.  Bone density scan (DEXA): At age 50, ask your care team if you should have this scan for osteoporosis (brittle bones).  Hepatitis C: Get tested at least once in your life.  STIs (sexually transmitted  infections)  Before age 24: Ask your care team if you should be screened for STIs.  After age 24: Get screened for STIs if you're at risk. You are at risk for STIs (including HIV) if:  You are sexually active with more than one person.  You don't use condoms every time.  You or a partner was diagnosed with a sexually transmitted infection.  If you are at risk for HIV, ask about PrEP medicine to prevent HIV.  Get tested for HIV at least once in your life, whether you are at risk for HIV or not.  Cancer screening tests  Cervical cancer screening: If you have a cervix, begin getting regular cervical cancer screening tests at age 21. Most people who have regular screenings with normal results can stop after age 65. Talk about this with your provider.  Breast cancer scan (mammogram): If you've ever had breasts, begin having regular mammograms starting at age 40. This is a scan to check for breast cancer.  Colon cancer screening: It is important to start screening for colon cancer at age 45.  Have a colonoscopy test every 10 years (or more often if you're at risk) Or, ask your provider about stool tests like a FIT test every year or Cologuard test every 3 years.  To learn more about your testing options, visit: https://www.[email or web address]/[email or web address].  For help making a decision, visit: https://bit.ly/qk62773.  Prostate cancer screening test: If you have a prostate and are age 55 to 69, ask your provider if you would benefit from a yearly prostate cancer screening test.  Lung cancer screening: If you are a current or former smoker age 50 to 80, ask your care team if ongoing lung cancer screenings are right for you.  For informational purposes only. Not to replace the advice of your health care provider. Copyright   2023 [employer]. All rights reserved. Clinically reviewed by the [facility]. [employer] [id number] - REV 01/24.    Learning About Stress  What is stress?     Stress is your  body's response to a hard situation. Your body can have a physical, emotional, or mental response. Stress is a fact of life for most people, and it affects everyone differently. What causes stress for you may not be stressful for someone else.  A lot of things can cause stress. You may feel stress when you go on a job interview, take a test, or run a race. This kind of short-term stress is normal and even useful. It can help you if you need to work hard or react quickly. For example, stress can help you finish an important job on time.  Long-term stress is caused by ongoing stressful situations or events. Examples of long-term stress include long-term health problems, ongoing problems at work, or conflicts in your family. Long-term stress can harm your health.  How does stress affect your health?  When you are stressed, your body responds as though you are in danger. It makes hormones that speed up your heart, make you breathe faster, and give you a burst of energy. This is called the fight-or-flight stress response. If the stress is over quickly, your body goes back to normal and no harm is done.  But if stress happens too often or lasts too long, it can have bad effects. Long-term stress can make you more likely to get sick, and it can make symptoms of some diseases worse. If you tense up when you are stressed, you may develop neck, shoulder, or low back pain. Stress is linked to high blood pressure and heart disease.  Stress also harms your emotional health. It can make you mcdonnell, tense, or depressed. Your relationships may suffer, and you may not do well at work or school.  What can you do to manage stress?  You can try these things to help manage stress:   Do something active. Exercise or activity can help reduce stress. Walking is a great way to get started. Even everyday activities such as housecleaning or yard work can help.  Try yoga or chapincito chi. These techniques combine exercise and meditation. You may need  some training at first to learn them.  Do something you enjoy. For example, listen to music or go to a movie. Practice your hobby or do volunteer work.  Meditate. This can help you relax, because you are not worrying about what happened before or what may happen in the future.  Do guided imagery. Imagine yourself in any setting that helps you feel calm. You can use online videos, books, or a teacher to guide you.  Do breathing exercises. For example:  From a standing position, bend forward from the waist with your knees slightly bent. Let your arms dangle close to the floor.  Breathe in slowly and deeply as you return to a standing position. Roll up slowly and lift your head last.  Hold your breath for just a few seconds in the standing position.  Breathe out slowly and bend forward from the waist.  Let your feelings out. Talk, laugh, cry, and express anger when you need to. Talking with supportive friends or family, a counselor, or a shai leader about your feelings is a healthy way to relieve stress. Avoid discussing your feelings with people who make you feel worse.  Write. It may help to write about things that are bothering you. This helps you find out how much stress you feel and what is causing it. When you know this, you can find better ways to cope.  What can you do to prevent stress?  You might try some of these things to help prevent stress:  Manage your time. This helps you find time to do the things you want and need to do.  Get enough sleep. Your body recovers from the stresses of the day while you are sleeping.  Get support. Your family, friends, and community can make a difference in how you experience stress.  Limit your news feed. Avoid or limit time on social media or news that may make you feel stressed.  Do something active. Exercise or activity can help reduce stress. Walking is a great way to get started.  Where can you learn more?  Go to https://www.healthwise.net/patiented  Enter N032 in the  "search box to learn more about \"Learning About Stress.\"  Current as of: October 24, 2023               Content Version: 14.0    1425-2097 [employer].   Care instructions adapted under license by your healthcare professional. If you have questions about a medical condition or this instruction, always ask your healthcare professional. [employer] disclaims any warranty or liability for your use of this information.      Learning About Depression Screening  What is depression screening?  Depression screening is a way to see if you have depression symptoms. It may be done by a doctor or counselor. It's often part of a routine checkup. That's because your mental health is just as important as your physical health.  Depression is a mental health condition that affects how you feel, think, and act. You may:  Have less energy.  Lose interest in your daily activities.  Feel sad and grouchy for a long time.  Depression is very common. It affects people of all ages.  Many things can lead to depression. Some people become depressed after they have a stroke or find out they have a major illness like cancer or heart disease. The death of a loved one or a breakup may lead to depression. It can run in families. Most experts believe that a combination of inherited genes and stressful life events can cause it.  What happens during screening?  You may be asked to fill out a form about your depression symptoms. You and the doctor will discuss your answers. The doctor may ask you more questions to learn more about how you think, act, and feel.  What happens after screening?  If you have symptoms of depression, your doctor will talk to you about your options.  Doctors usually treat depression with medicines or counseling. Often, combining the two works best. Many people don't get help because they think that they'll get over the depression on their own. But people with depression may not get better unless they " "get treatment.  The cause of depression is not well understood. There may be many factors involved. But if you have depression, it's not your fault.  A serious symptom of depression is thinking about death or suicide. If you or someone you care about talks about this or about feeling hopeless, get help right away.  It's important to know that depression can be treated. Medicine, counseling, and self-care may help.  Where can you learn more?  Go to https://www.[employer].net/patiented  Enter T185 in the search box to learn more about \"Learning About Depression Screening.\"  Current as of: June 24, 2023               Content Version: 14.0    7005-1026 [employer].   Care instructions adapted under license by your healthcare professional. If you have questions about a medical condition or this instruction, always ask your healthcare professional. [employer] disclaims any warranty or liability for your use of this information.      Learning About Alcohol Use Disorder  What is alcohol use disorder?  Alcohol use disorder means that a person drinks alcohol even though it causes harm to themselves or others. It can range from mild to severe. The more symptoms of this disorder you have, the more severe it may be. People who have it may find it hard to control their use of alcohol.  People who have this disorder may argue with others about how much they're drinking. Their job may be affected because of drinking. They may drink when it's dangerous or illegal, such as when they drive. They also may have a strong need, or craving, to drink. They may feel like they must drink just to get by. Their drinking may increase their risk of getting hurt or being in a car crash.  Over time, drinking too much alcohol may cause health problems. These may include high blood pressure, liver problems, or problems with digestion.  What are the symptoms?  Maybe you've wondered about your alcohol habits or how to tell if " your drinking is becoming a problem.  Here are some of the symptoms of alcohol use disorder. You may have it if you have two or more of the following symptoms:  You drink larger amounts of alcohol than you ever meant to. Or you've been drinking for a longer time than you ever meant to.  You can't cut down or control your use. Or you constantly wish you could cut down.  You spend a lot of time getting or drinking alcohol or recovering from its effects.  You have strong cravings for alcohol.  You can no longer do your main jobs at work, at school, or at home.  You keep drinking alcohol, even though your use hurts your relationships.  You have stopped doing important activities because of your alcohol use.  You drink alcohol in situations where doing so is dangerous.  You keep drinking alcohol even though you know it's causing health problems.  You need more and more alcohol to get the same effect, or you get less effect from the same amount over time. This is called tolerance.  You have uncomfortable symptoms when you stop drinking alcohol or use less. This is called withdrawal.  Alcohol use disorder can range from mild to severe. The more symptoms you have, the more severe the disorder may be.  You might not realize that your drinking is a problem. You might not drink large amounts when you drink. Or you might go for days or weeks between drinking episodes. But even if you don't drink very often, your drinking could still be harmful and put you at risk.  How is alcohol use disorder treated?  Getting help is up to you. But you don't have to do it alone. There are many people and kinds of treatments that can help.  Treatment for alcohol use disorder can include:  Group therapy, one or more types of counseling, and alcohol education.  Medicines that help to:  Reduce withdrawal symptoms and help you safely stop drinking.  Reduce cravings for alcohol.  Support groups. These groups include Alcoholics Anonymous and Mytonomy  "Recovery (Self-Management and Recovery Training).  Some people are able to stop or cut back on drinking with help from a counselor. People who have moderate to severe alcohol use disorder may need medical treatment. They may need to stay in a hospital or treatment center.  You may have a treatment team to help you. This team may include a psychologist or psychiatrist, counselors, doctors, social workers, nurses, and a . A  helps plan and manage your treatment.  Follow-up care is a key part of your treatment and safety. Be sure to make and go to all appointments, and call your doctor if you are having problems. It's also a good idea to know your test results and keep a list of the medicines you take.  Where can you learn more?  Go to https://www.[employer].net/patiented  Enter H758 in the search box to learn more about \"Learning About Alcohol Use Disorder.\"  Current as of: November 15, 2023               Content Version: 14.0    6866-0863 [employer].   Care instructions adapted under license by your healthcare professional. If you have questions about a medical condition or this instruction, always ask your healthcare professional. [employer] disclaims any warranty or liability for your use of this information.      Substance Use Disorder: Care Instructions  Overview     You can improve your life and health by stopping your use of alcohol or drugs. When you don't drink or use drugs, you may feel and sleep better. You may get along better with your family, friends, and coworkers. There are medicines and programs that can help with substance use disorder.  How can you care for yourself at home?  Here are some ways to help you stay sober and prevent relapse.  If you have been given medicine to help keep you sober or reduce your cravings, be sure to take it exactly as prescribed.  Talk to your doctor about programs that can help you stop using drugs or drinking " alcohol.  Do not keep alcohol or drugs in your home.  Plan ahead. Think about what you'll say if other people ask you to drink or use drugs. Try not to spend time with people who drink or use drugs.  Use the time and money spent on drinking or drugs to do something that's important to you.  Preventing a relapse  Have a plan to deal with relapse. Learn to recognize changes in your thinking that lead you to drink or use drugs. Get help before you start to drink or use drugs again.  Try to stay away from situations, friends, or places that may lead you to drink or use drugs.  If you feel the need to drink alcohol or use drugs again, seek help right away. Call a trusted friend or family member. Some people get support from organizations such as Narcotics Anonymous or [employer] or from treatment facilities.  If you relapse, get help as soon as you can. Some people make a plan with another person that outlines what they want that person to do for them if they relapse. The plan usually includes how to handle the relapse and who to notify in case of relapse.  Don't give up. Remember that a relapse doesn't mean that you have failed. Use the experience to learn the triggers that lead you to drink or use drugs. Then quit again. Recovery is a lifelong process. Many people have several relapses before they are able to quit for good.  Follow-up care is a key part of your treatment and safety. Be sure to make and go to all appointments, and call your doctor if you are having problems. It's also a good idea to know your test results and keep a list of the medicines you take.  When should you call for help?   Call 911  anytime you think you may need emergency care. For example, call if you or someone else:    Has overdosed or has withdrawal signs. Be sure to tell the emergency workers that you are or someone else is using or trying to quit using drugs. Overdose or withdrawal signs may include:  Losing  "consciousness.  Seizure.  Seeing or hearing things that aren't there (hallucinations).     Is thinking or talking about suicide or harming others.   Where to get help 24 hours a day, 7 days a week   If you or someone you know talks about suicide, self-harm, a mental health crisis, a substance use crisis, or any other kind of emotional distress, get help right away. You can:    Call the Suicide and Crisis Lifeline at 988.     Call 2-680-520-TALK (1-242.510.3007).     Text HOME to 876988 to access the Crisis Text Line.   Consider saving these numbers in your phone.  Go to [employer] for more information or to chat online.  Call your doctor now or seek immediate medical care if:    You are having withdrawal symptoms. These may include nausea or vomiting, sweating, shakiness, and anxiety.   Watch closely for changes in your health, and be sure to contact your doctor if:    You have a relapse.     You need more help or support to stop.   Where can you learn more?  Go to https://www.[employer].net/patiented  Enter H573 in the search box to learn more about \"Substance Use Disorder: Care Instructions.\"  Current as of: November 15, 2023               Content Version: 14.0    0290-8705 [employer].   Care instructions adapted under license by your healthcare professional. If you have questions about a medical condition or this instruction, always ask your healthcare professional. [employer] disclaims any warranty or liability for your use of this information.      "

## 2024-04-12 NOTE — PROGRESS NOTES
Preventive Care Visit  [facility]  Leeroy Ewing PA-C, Family Medicine  Apr 12, 2024      Assessment & Plan     Routine general medical examination at a health care facility    - CBC with platelets and differential; Future  - Comprehensive metabolic panel (BMP + Alb, Alk Phos, ALT, AST, Total. Bili, TP); Future  - Vitamin D Deficiency; Future  - TSH with free T4 reflex; Future  - CBC with platelets and differential  - Comprehensive metabolic panel (BMP + Alb, Alk Phos, ALT, AST, Total. Bili, TP)  - Vitamin D Deficiency  - TSH with free T4 reflex    Moderate episode of recurrent major depressive disorder (H)  Does feel like the meds have been helping, has upcoming follow up with psych    CARDIOVASCULAR SCREENING; LDL GOAL LESS THAN 160    - Lipid panel reflex to direct LDL Non-fasting; Future  - Lipid panel reflex to direct LDL Non-fasting              Nicotine/Tobacco Cessation  He reports that he has been smoking cigarettes. He started smoking about 3 years ago. He has a 0.8 pack-year smoking history. He has never used smokeless tobacco.  Nicotine/Tobacco Cessation Plan  Information offered: Patient not interested at this time      Depression Screening Follow Up                  Follow Up Actions Taken  Crisis resource information provided in the After Visit Summary  Referred patient back to mental health provider    Discussed the following ways the patient can remain in a safe environment:  be around others  Counseling  Appropriate preventive services were discussed with this patient, including applicable screening as appropriate for fall prevention, nutrition, physical activity, Tobacco-use cessation, weight loss and cognition.  Checklist reviewing preventive services available has been given to the patient.  Reviewed patient's diet, addressing concerns and/or questions.   He is at risk for lack of exercise and has been provided with information to increase physical activity for the  benefit of his well-being.   The patient reports drinking more than one alcoholic drink per day and sometimes engages in binge or excessive drinking. The patient was counseled and given information about possible harmful effects of excessive alcohol intake as well as where to get help for alcohol problems. The patient's PHQ-9 score is consistent with moderate depression. He was provided with information regarding depression.           Jonny Stafford is a 28 year old, presenting for the following:  Physical (Pt is not fasting)        4/12/2024    10:10 AM   Additional Questions   Roomed by Brooklynn   Accompanied by n/a        Health Care Directive  Patient does not have a Health Care Directive or Living Will:     HPI              4/12/2024   General Health   How would you rate your overall physical health? (!) FAIR   Feel stress (tense, anxious, or unable to sleep) Very much   (!) STRESS CONCERN      4/12/2024   Nutrition   Three or more servings of calcium each day? (!) NO   Diet: Regular (no restrictions)   How many servings of fruit and vegetables per day? (!) 0-1   How many sweetened beverages each day? (!) 2         4/12/2024   Exercise   Days per week of moderate/strenous exercise 3 days   Average minutes spent exercising at this level 60 min         4/12/2024   Social Factors   Worry food won't last until get money to buy more No   Food not last or not have enough money for food? No   Do you have housing?  No   Are you worried about losing your housing? No   Lack of transportation? No   Unable to get utilities (heat,electricity)? No   Want help with housing or utility concern? No   (!) HOUSING CONCERN PRESENT      4/12/2024   Dental   Dentist two times every year? Yes         4/12/2024   TB Screening   Were you born outside of the [place]? No       Today's PHQ-9 Score:       4/12/2024     9:21 AM   PHQ-9 SCORE   PHQ-9 Total Score MyChart 18 (Moderately severe depression)   PHQ-9 Total Score 18          4/12/2024   Substance Use   Alcohol more than 3/day or more than 7/wk Yes   How often do you have a drink containing alcohol 2 to 3 times a week   How many alcohol drinks on typical day 3 or 4   How often do you have 5+ drinks at one occasion Monthly   Audit 2/3 Score 3   How often not able to stop drinking once started Monthly   How often failed to do what normally expected Less than monthly   How often needed first drink in am after a heavy drinking session Never   How often feeling of guilt or remorse after drinking Weekly   How often unable to remember what happened the night before Less than monthly   Have you or someone else been injured because of your drinking Yes, during the last year   Has anyone been concerned or suggested you cut down on drinking Yes, during the last year   TOTAL SCORE - AUDIT 21   Do you use any other substances recreationally? (!) ALCOHOL    (!) CANNABIS PRODUCTS     Social History     Tobacco Use    Smoking status: Some Days     Current packs/day: 0.25     Average packs/day: 0.3 packs/day for 3.3 years (0.8 ttl pk-yrs)     Types: Cigarettes     Start date: 1/1/2021    Smokeless tobacco: Never    Tobacco comments:     Vape   Vaping Use    Vaping status: Some Days   Substance Use Topics    Alcohol use: Yes    Drug use: Yes     Types: Marijuana             4/12/2024   One time HIV Screening   Previous HIV test? I don't know         4/12/2024   STI Screening   New sexual partner(s) since last STI/HIV test? No         4/12/2024   Contraception/Family Planning   Questions about contraception or family planning No        Reviewed and updated as needed this visit by Provider                    BP Readings from Last 3 Encounters:   04/12/24 122/81   01/04/21 133/87   08/29/19 119/82    Wt Readings from Last 3 Encounters:   04/12/24 82 kg (180 lb 11.2 oz)   04/21/22 80 kg (176 lb 4.8 oz)   01/04/21 75.3 kg (166 lb 1.6 oz)                      Review of Systems  Constitutional, HEENT,  "cardiovascular, pulmonary, gi and gu systems are negative, except as otherwise noted.     Objective    Exam  /81 (BP Location: Left arm, Patient Position: Sitting, Cuff Size: Adult Regular)   Pulse 86   Temp 98.1  F (36.7  C) (Temporal)   Resp 16   Ht 1.85 m (6' 0.84\")   Wt 82 kg (180 lb 11.2 oz)   SpO2 97%   BMI 23.95 kg/m     Estimated body mass index is 23.95 kg/m  as calculated from the following:    Height as of this encounter: 1.85 m (6' 0.84\").    Weight as of this encounter: 82 kg (180 lb 11.2 oz).    Physical Exam  GENERAL: alert and no distress  EYES: Eyes grossly normal to inspection  HENT: ear canals and TM's normal, nose and mouth without ulcers or lesions  NECK: no adenopathy, no asymmetry, masses, or scars  RESP: lungs clear to auscultation - no rales, rhonchi or wheezes  CV: regular rate and rhythm, normal S1 S2, no S3 or S4, no murmur, click or rub, no peripheral edema  ABDOMEN: soft, nontender, no hepatosplenomegaly, no masses and bowel sounds normal  MS: no gross musculoskeletal defects noted, no edema  SKIN: no suspicious lesions or rashes  NEURO: Normal strength and tone, mentation intact and speech normal  PSYCH: mentation appears normal, affect normal/bright        Signed Electronically by: Leeroy Ewing PA-C    Answers submitted by the patient for this visit:  Patient Health Questionnaire (Submitted on 4/12/2024)  If you checked off any problems, how difficult have these problems made it for you to do your work, take care of things at home, or get along with other people?: Very difficult  PHQ9 TOTAL SCORE: 18  LUIS MIGUEL-7 (Submitted on 4/12/2024)  LUIS MIGUEL 7 TOTAL SCORE: 14    "

## 2024-04-12 NOTE — COMMUNITY RESOURCES LIST (ENGLISH)
April 12, 2024           YOUR PERSONALIZED LIST OF SERVICES & PROGRAMS           & SHELTER    Housing      [facility] - Adult senior care Connect - [facility]  [address] [place] [address] (Distance: 2.4 miles)  Phone: [phone]  Website: http://www.saintolaf.org/  Language: English  Fee: Free  Accessibility: Ada accessible      [facility] - Hotline - Housing [facility]  [address] [place] [address] (Distance: 2.4 miles)  Phone: (985) 230-7820  Website: http://www.saintolaf.org/  Language: English  Fee: Free  Accessibility: Ada accessible      [facility] - Housing Stabilization Services  Phone: [phone]) [phone]  Website: https://[employer]/Housing-Stabilization.html  Language: English  Hours: Mon 9:00 AM - 5:00 PM Tue 9:00 AM - 5:00 PM Wed 9:00 AM - 5:00 PM Thu 9:00 AM - 5:00 PM Fri 9:00 AM - 5:00 PM  Fee: Insurance  Accessibility: Deaf or hard of hearing, Translation services    Case Management      Living - Housing Support-[facility] ([facility])  [address] [place] [address] (Distance: 1.5 miles)  Phone: [phone]  Website: https://[employer]  Language: Nepali, English, Samoan  Fee: Insurance      [facility]  [address] [place] [address] (Distance: 5.1 miles)  Phone: [phone]  Website: https://www.[employer].InRadio/contact  Language: English, Cameroonian  Accessibility: [place] accessible, Blind accommodation, Deaf or hard of hearing, Translation services      [employer] [facility] - Housing Stabilization Services  Phone: [phone]  Website: https://homebasemn.com/  Language: English  Hours: Mon 8:00 AM - 4:00 PM Tue 8:00 AM - 4:00 PM Wed 8:00 AM - 4:00 PM Thu 8:00 AM - 4:00 PM Fri 8:00 AM - 4:00 PM  Fee: Free  Accessibility: Blind accommodation, Deaf or hard of hearing  Transportation Options: Free transportation    Drop-In Services      [facility]  - Drop-in center or day shelter  [address] [place] [address] (Distance: 2.5 miles)  Phone: [phone]) [phone]  Language: English  Fee: Free  Accessibility: [place] accessible, Translation services      [facility] - Drop-in center or day shelter  [address] [place] [address] (Distance: 3.6 miles)  Phone: [phone]  Language: English  Fee: Free      [employer]  Website: http://www.laundrylove.org               IMPORTANT NUMBERS & WEBSITES        Emergency Services  911  .   [employer] Way  211 http://211unitedway.org  .   Poison Control  (839) 662-6681 http://[place]poison.org http://w[place]poison.org  .     Suicide and Crisis Lifeline  988 http://988Broadband Networks Wireless Internetline.org  .   Childhelp [place] Child Abuse Hotline  900.756.3436 http://Childhelphotline.org   .   [place] Sexual Assault Hotline  (504) 778-2613 (HOPE) http://Surveypal.org   .     [place] Runaway Safeline  (198) 472-4539 (RUNAWAY) http://[employer].Sincuru  .   Pregnancy & Postpartum Support  Call/text 076-697-6842  [place]: http://ppsupport[place].org  [place]: http://BeOnDesk.com/wi  .   Substance Abuse National Helpline ([facility])  560-240-HELP (0505) http://Findtreatment.gov   .                DISCLAIMER: These resources have been generated via the [employer] Platform. [employer] does not endorse any service providers mentioned in this resource list. [employer] does not guarantee that the services mentioned in this resource list will be available to you or will improve your health or wellness.    [facility]

## 2024-04-15 NOTE — RESULT ENCOUNTER NOTE
Dear Rivera    Your test results are attached, feel free to contact me via [facility]     Everything looks ok on your labs.  Keep focusing on consistent exercise with a low sugar/processed food diet.    Darryl Ewing PA-C

## 2024-04-29 NOTE — PROGRESS NOTES
"    [facility] [place]       PATIENT'S NAME: Rivera Paz  PREFERRED NAME: Rivera  PRONOUNS:  he/him  MRN: [id number]  : 1995  ADDRESS: [address]  [facility] [address]  [facility] NUM[facility]:  [id number]  DATE OF SERVICE: 24  START TIME: 828am  END TIME: 854am  PREFERRED PHONE: [phone]  May we leave a program related message: Yes  EMERGENCY CONTACT: was obtained     Kathleen Paz (Mother)  [phone] (Mobile)    .  SERVICE MODALITY:  Video Visit:      Provider verified identity through the following two step process.  Patient provided:  Patient  and Patient address    Telemedicine Visit: The patient's condition can be safely assessed and treated via synchronous audio and visual telemedicine encounter.      Reason for Telemedicine Visit: Services only offered telehealth    Originating Site (Patient Location): Patient's home    Distant Site (Provider Location): Provider Remote Setting- Home Office    Consent:  The patient/guardian has verbally consented to: the potential risks and benefits of telemedicine (video visit) versus in person care; bill my insurance or make self-payment for services provided; and responsibility for payment of non-covered services.     Patient would like the video invitation sent by:  My Chart    Mode of Communication:  Video Conference via Amwell    Distant Location (Provider):  Off-site    As the provider I attest to compliance with applicable laws and regulations related to telemedicine.    UNIVERSAL ADULT Mental Health DIAGNOSTIC ASSESSMENT    Identifying Information:  Patient is a 28 year old,   individual.  Patient was referred for an assessment by family.  Patient attended the session alone.    Chief Complaint:   The reason for seeking services at this time is: \"ADD\".  The problem(s) began 04.    Patient has attempted to resolve these concerns in the past through previous med mgmt and therapy  " .    Social/Family History:  Patient reported they grew up in [facility]  .  They were raised by biological parents  .  Parents  / .  Patient reported that their childhood was happy for the most part, dad  at age 11, no abuse concerns, cared for.  Patient described their current relationships with family of origin as 2 half sisters- doesn't see as often, they are good.  Good relationship with mom. Father is  from suicide.    The patient describes their cultural background as .  Cultural influences and impact on patient's life structure, values, norms, and healthcare: [place].  Contextual influences on patient's health include: N/a.    These factors will be addressed in the Preliminary Treatment plan. Patient identified their preferred language to be English. Patient reported they does not need the assistance of an  or other support involved in therapy.     Patient reported had no significant delays in developmental tasks.   Patient's highest education level was high school graduate  .  Patient identified the following learning problems: attention and concentration.  Modifications will not be used to assist communication in therapy.  Patient reports they are  able to understand written materials.    Patient reported the following relationship history never .  Patient's current relationship status is single for 5 years.   Patient identified their sexual orientation as heterosexual.  Patient reported having 0 child(ele). Patient identified mother; therapist as part of their support system.  Patient identified the quality of these relationships as fair,  .      Patient's current living/housing situation involves staying with someone lives with mom.  The immediate members of family and household include Farhan Wang,Mom  and they report that housing is stable.    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  Patient reports their finances  are obtained through parents; family; general assistance. Patient does identify finances as a current stressor.      Patient reported that they have not been involved with the legal system.    . Patient does not report being under probation/ parole/ jurisdiction. .    Patient's Strengths and Limitations:  Patient identified the following strengths or resources that will help them succeed in treatment: friends / good social support, family support, insight, and intelligence. Things that may interfere with the patient's success in treatment include: financial hardship.     Assessments:  The following assessments were completed by patient for this visit:  PHQ2:       4/21/2022     1:09 PM 12/28/2020     9:23 PM 8/29/2019     2:06 PM 10/16/2018     6:09 PM 10/16/2018     6:07 PM   PHQ-2 ( 1999 [employer])   Q1: Little interest or pleasure in doing things 2 1 1 2    Q2: Feeling down, depressed or hopeless 2 1 2 2    PHQ-2 Score 4 2 3 4    PHQ-2 Total Score (12-17 Years)- Positive if 3 or more points; Administer PHQ-A if positive  2 3     Q1: Little interest or pleasure in doing things More than half the days Several days Several days  More than half the days   Q2: Feeling down, depressed or hopeless More than half the days Several days More than half the days  More than half the days   PHQ-2 Score 4 2 3  4     PHQ9:       6/18/2019     2:14 PM 8/29/2019     2:06 PM 1/4/2021    12:00 PM 4/21/2022     1:09 PM 4/4/2024    11:59 AM 4/12/2024     9:21 AM 5/1/2024     5:33 PM   PHQ-9 SCORE   PHQ-9 Total Score MyChart  13 (Moderate depression)  13 (Moderate depression)  18 (Moderately severe depression) 25 (Severe depression)   PHQ-9 Total Score 0 13 8 13 20 18 25    25     GAD2:       4/25/2024    12:22 PM   LUIS MIGUEL-2   Feeling nervous, anxious, or on edge 3   Not being able to stop or control worrying 2   LUIS MIGUEL-2 Total Score 5    5     GAD7:       4/4/2024    11:59 AM 4/12/2024     9:22 AM   LUIS MIGUEL-7 SCORE   Total Score  14 (moderate  anxiety)   Total Score 16 14     CAGE-AID:       4/25/2024    12:38 PM   CAGE-AID Total Score   Total Score 2    2   Total Score MyChart 2 (A total score of 2 or greater is considered clinically significant)     PROMIS 10-Global Health (only subscores and total score):       4/25/2024    12:35 PM   PROMIS-10 Scores Only   Global Mental Health Score 7    7   Global Physical Health Score 12    12   PROMIS TOTAL - SUBSCORES 19    19     Hickman Suicide Severity Rating Scale (Lifetime/Recent)      5/2/2024     9:05 AM   Hickman Suicide Severity Rating (Lifetime/Recent)   Q1 Wish to be Dead (Lifetime) Y   1. Wish to be Dead (Past 1 Month) Y   Q2 Non-Specific Active Suicidal Thoughts (Lifetime) Y   2. Non-Specific Active Suicidal Thoughts (Past 1 Month) N   3. Active Suicidal Ideation with any Methods (Not Plan) Without Intent to Act (Lifetime) Y   Q3 Active Suicidal Ideation with any Methods (Not Plan) Without Intent to Act (Past 1 Month) N   Q4 Active Suicidal Ideation with Some Intent to Act, Without Specific Plan (Lifetime) Y   4. Active Suicidal Ideation with Some Intent to Act, Without Specific Plan (Past 1 Month) N   Q5 Active Suicidal Ideation with Specific Plan and Intent (Lifetime) N   Most Severe Ideation Rating (Lifetime) 4   Most Severe Ideation Rating (Past 1 Month) 1   Frequency (Lifetime) 4   Frequency (Past 1 Month) 4   Duration (Lifetime) 4   Duration (Past 1 Month) 4   Controllability (Lifetime) 4   Controllability (Past 1 Month) 1   Deterrents (Lifetime) 2   Deterrents (Past 1 Month) 1   Reasons for Ideation (Lifetime) 5   Reasons for Ideation (Past 1 Month) 5   Actual Attempt (Lifetime) N   Has subject engaged in non-suicidal self-injurious behavior? (Lifetime) Y   Has subject engaged in non-suicidal self-injurious behavior? (Past 3 Months) N   Interrupted Attempts (Lifetime) N   Aborted or Self-Interrupted Attempt (Lifetime) Y   Total Number of Aborted or Self-Interrupted Attempts (Lifetime) 1    Aborted or Self-Interrupted Attempt Description (Lifetime) plan to hang self at age 18, drinking, called friend who brought him to ED and was hospitalized at [facility]   Aborted or Self-Interrupted Attempt (Past 3 Months) N   Preparatory Acts or Behavior (Lifetime) N   Calculated C-SSRS Risk Score (Lifetime/Recent) Moderate Risk       Personal and Family Medical History:  Patient does report a family history of mental health concerns.  Patient reports family history includes Depression in his father and mother..     Patient does report Mental Health Diagnosis and/or Treatment.  Patient reported the following previous diagnoses which include(s): ADHD; depression .  Patient reported symptoms began child thompson.  Patient has received mental health services in the past:  therapy; Behavioral Health Clinician; psychiatry  .  Psychiatric Hospitalizations: 1 at age 18 at [facility]  Patient denies a history of civil commitment.      Currently, patient   is receiving other mental health services.  These include   psychotherapy with Feelcia Eastman .   Hx of psychiatry.     Patient has had a physical exam to rule out medical causes for current symptoms.  Date of last physical exam was within the past year. Client was encouraged to follow up with PCP if symptoms were to develop. The patient has a [place] Primary Care Provider, who is named Leeroy Ewing..  Patient reports no current medical concerns.  Patient denies any issues with pain..   There are not significant appetite / nutritional concerns / weight changes.   Patient does not report a history of head injury / trauma / cognitive impairment.      Patient reports current meds as:   Current Outpatient Medications   Medication Sig Dispense Refill    buPROPion (WELLBUTRIN XL) 150 MG 24 hr tablet TAKE 1 TABLET BY MOUTH EVERY MORNING.; PATIENT DUE FOR APPOINTMENT(PHYSICAL) 90 tablet 3    escitalopram (LEXAPRO) 20 MG tablet TAKE 1 TABLET BY MOUTH  DAILY( PATIENT DUE FOR APPOITMENT) 90 tablet 3     No current facility-administered medications for this visit.       Medication Adherence:  Patient reports taking.  taking prescribed medications as prescribed.    Patient Allergies:    Allergies   Allergen Reactions    Cats        Medical History:    Past Medical History:   Diagnosis Date    Depressive disorder          Current Mental Status Exam:   Appearance:  Appropriate    Eye Contact:  Good   Psychomotor:  Normal       Gait / station:  no problem  Attitude / Demeanor: Cooperative   Speech      Rate / Production: Normal/ Responsive      Volume:  Normal  volume      Language:  intact  Mood:   Anxious  Depressed   Affect:   Flat    Thought Content: Clear   Thought Process: Coherent  Goal Directed       Associations: No loosening of associations  Insight:   Good   Judgment:  Intact   Orientation:  Person Place Time Situation All  Attention/concentration: Good    Substance Use:   Patient did not report a family history of substance use concerns; see medical history section for details.  Patient has not received chemical dependency treatment in the past.  Patient has not ever been to detox.      Patient is not currently receiving any chemical dependency treatment.           Substance History of use Age of first use Date of last use     Pattern and duration of use (include amounts and frequency)   Alcohol currently use   18 04/19/24 Every weekend/few times a week 1-7   Cannabis   currently use 18 04/25/24 Flower, recreationally, daily     Amphetamines   never used     REPORTS SUBSTANCE USE: N/A   Cocaine/crack    used in the past 21 05/01/17  At parties, social   Hallucinogens used in the past   18 02/14/23  LSD Mushrooms, festivals social/parties   Inhalants never used         REPORTS SUBSTANCE USE: N/A   Heroin never used         REPORTS SUBSTANCE USE: N/A   Other Opiates never used     REPORTS SUBSTANCE USE: N/A   Benzodiazepine   never used     REPORTS SUBSTANCE  USE: N/A   Barbiturates never used     REPORTS SUBSTANCE USE: N/A   Over the counter meds never used     REPORTS SUBSTANCE USE: N/A   Caffeine currently use >18   daily   Nicotine  currently use 19 04/24/24 Yes   Other substances not listed above:  Identify:  never used     REPORTS SUBSTANCE USE: N/A     Patient reported the following problems as a result of their substance use: academic; relationship problems.    Substance Use: No symptoms    Based on the positive CAGE score and clinical interview there  are indications of drug or alcohol abuse. Recommendation for substance abuse disorder evaluation with a substance use professional was given. Therapist did recommend client to reduce use or abstain from alcohol or substance use. Therapist did not recommend structured treatment and or community support (AA, 12 step group, etc.). Pt declined formal assessment/tx at this time but is open to the idea in the future if use remains elevated .    Significant Losses / Trauma / Abuse / Neglect Issues:   Patient did not  serve in the .  There are indications or report of significant loss, trauma, abuse or neglect issues related to:  : .   Loss of dad to suicide  Concerns for possible neglect are not present.     Safety Assessment:   Patient denies current homicidal ideation and behaviors.  Patient denies current self-injurious ideation and behaviors.    Patient denied risk behaviors associated with substance use.   Patient denies any high risk behaviors associated with mental health symptoms.  Patient reports the following current concerns for their personal safety: None.  Patient reports there are not firearms in the house.       History of Safety Concerns:  Patient denied a history of homicidal ideation.     Patient denied a history of personal safety concerns.    Patient denied a history of assaultive behaviors.    Patient denied a history of sexual assault behaviors.     Patient denied a history of risk behaviors  associated with substance use.  Patient denies any history of high risk behaviors associated with mental health symptoms.  Patient reports the following protective factors: forward or future oriented thinking; dedication to family or friends; living with other people; access to a variety of clinical interventions and pets    Risk Plan:  See Recommendations for Safety and Risk Management Plan    Review of Symptoms per patient report:   Depression: Change in sleep, Lack of interest, Excessive or inappropriate guilt, Change in energy level, Difficulties concentrating, Feelings of hopelessness, Feelings of helplessness, Low self-worth, Ruminations, Irritability, Feeling sad, down, or depressed, Withdrawn, Poor hygeine, Frequent crying, and Anger outbursts  SA:  SI: 10 years ago ER visit SI with plan to hang self  Hx of SIB teenager  Denies HI  Passive current ideation, no active plan/intent.  Chronic baseline  Future and goal motivated.  Safety plan completed.  Susi:  No Symptoms  Psychosis: No Symptoms  Anxiety: Excessive worry, Nervousness, Physical complaints, such as headaches, stomachaches, muscle tension, Sleep disturbance, Ruminations, Poor concentration, Irritability, and Anger outbursts  Panic:  Palpitations and Sense of impending doom  Post Traumatic Stress Disorder:  No Symptoms   Eating Disorder: No Symptoms  ADD / ADHD:  Inattentive, Difficulties listening, Poor task completion, Poor organizational skills, Distractibility, and Forgetful  Meds [place]-[employer].  Dx as kid.  Conduct Disorder: No symptoms  Autism Spectrum Disorder: No symptoms  Obsessive Compulsive Disorder: No Symptoms    Current Stressors / Issues:   update:  ROS above.  Sx have been worsening.  Low motivation and energy.  Stresses:  Grief and loss of parent, difficult functioning   Appetite: Variable  Sleep: Fatigued  Outpatient Provider updates: Jasmine Rubio  SI/SIB/HI:  Passive ideation current, no plan/intent; hx of  aborted attempt at age 18, hx SIB  HOSSEIN:  Desire to cut back.  Drinking weekly/wkd 1-7. THC daily.  Hx of shrooms/coke.  Will reconsier CD eval in the future.  Side effects/compliance: N/a  Interventions: [facility] engaged in completing DA with psychoeducation and tx plan  Most important:  ADD meds    Patient reports the following compulsive behaviors and treatment history:  N/a .      Diagnostic Criteria:   Generalized Anxiety Disorder  A. Excessive anxiety and worry about a number of events or activities (such as work or school performance).   B. The person finds it difficult to control the worry.  C. Select 3 or more symptoms (required for diagnosis). Only one item is required in children.   - Restlessness or feeling keyed up or on edge.    - Being easily fatigued.    - Difficulty concentrating or mind going blank.    - Irritability.    - Sleep disturbance (difficulty falling or staying asleep, or restless unsatisfying sleep).   D. The focus of the anxiety and worry is not confined to features of an Axis I disorder.  E. The anxiety, worry, or physical symptoms cause clinically significant distress or impairment in social, occupational, or other important areas of functioning.   F. The disturbance is not due to the direct physiological effects of a substance (e.g., a drug of abuse, a medication) or a general medical condition (e.g., hyperthyroidism) and does not occur exclusively during a Mood Disorder, a Psychotic Disorder, or a Pervasive Developmental Disorder. Major Depressive Disorder  CRITERIA (A-C) REPRESENT A MAJOR DEPRESSIVE EPISODE - SELECT THESE CRITERIA  A) Recurrent episode(s) - symptoms have been present during the same 2-week period and represent a change from previous functioning 5 or more symptoms (required for diagnosis)   - Depressed mood. Note: In children and adolescents, can be irritable mood.     - Diminished interest or pleasure in all, or almost all, activities.    - Fatigue or loss of energy.     - Feelings of worthlessness or inappropriate and excessive guilt.    - Diminished ability to think or concentrate, or indecisiveness.    - Recurrent thoughts of death (not just fear of dying), recurrent suicidal ideation without a specific plan, or a suicide attempt or a specific plan for committing suicide.   B) The symptoms cause clinically significant distress or impairment in social, occupational, or other important areas of functioning  C) The episode is not attributable to the physiological effects of a substance or to another medical condition  D) The occurence of major depressive episode is not better explained by other thought / psychotic disorders  E) There has never been a manic episode or hypomanic episode    Functional Status:  Patient reports the following functional impairments:  chronic disease management, management of the household and or completion of tasks, money management, relationship(s), self-care, social interactions, and work / vocational responsibilities.     Nonprogrammatic care:  Patient is requesting basic services to address current mental health concerns.    Clinical Summary:  1. Psychosocial, Cultural and Contextual Factors: hx traumatic loss  .  2. Principal DSM5 Diagnoses  (Sustained by DSM5 Criteria Listed Above):   Attention-Deficit/Hyperactivity Disorder  314.01 (F90.9) Unspecified Attention -Deficit / Hyperactivity Disorder  296.32 (F33.1) Major Depressive Disorder, Recurrent Episode, Moderate _ and With anxious distress  300.02 (F41.1) Generalized Anxiety Disorder.  3. Other Diagnoses that is relevant to services:   N.a.  4. Provisional Diagnosis:  N/a  5. Prognosis: Relieve Acute Symptoms.  6. Likely consequences of symptoms if not treated: decompensation of MH.  7. Client strengths include:  empathetic, good listener, has a previous history of therapy, insightful, intelligent, and motivated .     Recommendations:     1. Plan for Safety and Risk Management:   Safety and Risk:  A safety and risk management plan has been developed including: Patient consented to co-developed safety plan.  Safety and risk management plan was completed - see below.  Patient agreed to use safety plan should any safety concerns arise.  A copy was given to the patient..          Report to child / adult protection services was NA.     2. Patient's identified mental health concerns with a cultural influence will be addressed by per Pt request .     3. Initial Treatment will focus on:    Depressed Mood - .  Anxiety - .  Attentional Problems - . .     4. Resources/Service Plan:    services are not indicated.   Modifications to assist communication are not indicated.   Additional disability accommodations are not indicated.      5. Collaboration:   Collaboration / coordination of treatment will be initiated with the following  support professionals: psychiatry.      6.  Referrals:   The following referral(s) will be initiated: Psychiatry.       A Release of Information has been obtained for the following:  N/a .     Clinical Substantiation/medical necessity for the above recommendations:  Pt has a hx of depression, anxiety and ADHD symptoms that are impacting daily functioning in daily living and social settings. Through receiving support through CCPS model for medication and [facility] checking on use of coping skills and therapy to help combat these symptoms may provide Pt with relief. Pt reports that they are struggling to manage depressive and ADHD symptoms and again CCPS model can assist with providing coping skills, following up that pt is using these skills, safety plan or other interventions along with medication to have the best impact to manage symptoms and provide relief. At this time pt's symptoms are able to be managed with OP services and pt will be referred to a higher level of care if there are abrupt changes in presentation or risk of harm  .    7. HOSSEIN:    HOSSEIN:  Discussed the general effects of  drugs and alcohol on health and well-being and Attempt harm reduction by cutting back current use of ETOH . Provider gave patient printed information about the  effects of chemical use on their health and well being. Recommendations:  Pt declined formal assessment currently but is open in the future if remains elevated .     8. Records:   These were reviewed at time of assessment.   Information in this assessment was obtained from the medical record and  provided by patient who is a good historian.    Patient will have open access to their mental health medical record.    9.   Interactive Complexity: No    10. Safety Plan:   [facility] Safety Plan      Creation Date: 5/2/24 Last Update Date: 5/2/24      Step 1: Warning signs:    Warning Signs    increased isolation, depression, irritability, not caring for self      Step 2: Internal coping strategies - Things I can do to take my mind off my problems without contacting another person:    Strategies    getting out of the house    reading    distraction: TV, movies, netflix    Making music    Video games      Step 3: People and social settings that provide distraction:    Name Contact Information    Kathryn Kathleen in phone/lives with       Places    being outside      Step 4: People whom I can ask for help during a crisis:    Name Contact Information    kathryn Wang in phone/lives with      Step 5: Professionals or agencies I can contact during a crisis:    Clinician/Agency Name Phone Emergency Contact    Jasmine Rubio in phone       Local Emergency Department Emergency Department Address Emergency Department Phone    [facility]        Suicide Prevention Lifeline Phone: Call or Text 093  Crisis Text Line: Text HOME to 039974     Step 6: Making the environment safer (plan for lethal means safety):   Did not identify any lethal methods     Optional: What is most important to me and worth living for?:   Making music  Growing plants/gardening  Family  and friends      Things I am able to do on my own to cope or help me feel better: watching a favorite tv show or movie, listening to music I enjoy, going outside and breathing fresh air, going for a walk or exercising, taking a shower or bath, a cold or hot beverage, a healthy snack, drawing/coloring/painting, journaling, singing or dancing, deep breathing     I can try practicing square breathing when I begin to feel anxious - inhale through the nose for the count of 4 and the first line on the square. Exhale through the mouth for the count of 4 for the second line of the square. Repeat to complete the square. Repeat the square as many times as needed.    I can also use my five senses to practice mindfulness and grounding. What are five things I can see, four things I can hear, three things I can feel, two things I can smell, and one thing I can taste.     Things that I am able to do with others to cope or help me feel better: sometimes just talking or spending time with someone else, sharing a meal or having coffee, watching a movie or playing a game, going for a walk or exercising    I can also use community resources including mental health hotlines, [facility] crisis teams, or apps.     Things I can use or do for distraction: movies/tv, music, reading, games, drawing/coloring/painting or other art, essential oils, exercise, cleaning/organizing, puzzles, crossword puzzles, word search, Sudoku       I can also download a meditation or relaxation randal, like Calm, Headspace, or Insight Timer (all three offer a free version)    A great website resource is Change to Chill with active coping skills    Changes I can make to support my mental health and wellness: Attend scheduled mental health therapy and psychiatric appointments. Take my medications as prescribed. Maintain a daily schedule/routine. Abstain from all mood altering substances, including drugs, alcohol, or medications not currently prescribed to me. Implement a  "self-care routine.      Your county has a mental health crisis team you can call 24/7: [facility], [phone]    Other things that are important when I'm in crisis: to remember that the feelings I am having right now are temporary, and it won't feel like this forever, and that it is okay and important to ask for help    Community Resources  Fast Tracker  Linking people to mental health and substance use disorder resources  [place]     [place] Mental Health Warm Line  Peer to peer support  Monday thru Saturday, 12 pm to 10 pm  754.656.6810 or 6.425.965.5322  Text \"Support\" to 16586    National [place] on Mental Illness (MORRO)  819.550.3952 or 1888.MORRO.HELPS      Mental Health Apps  My3  https://Product Hunt.[employer]/    VirtualHopeBox  https://[employer]/apps/virtual-hope-box/      Crisis beds are short term community residential facilities that provide 1-10 day stabilization services.  You can self refer via calling them and inquiring into current openings.  This can be an alternative to hospitalization if you are able to be safe within the community.  This is a voluntary stay.  Some options in the [facility] include:    [facility]  ([place]) [phone]  [facility] ( [facility]) [phone]  [facility] ([place]) [phone]  [facility]) [phone]  [facility]) [phone]  [facility] ([place]) [phone]  [facility]) [phone]    If you feel like you are in need of more emergent support for safety concerns you can present to your local emergency room for a mental health evaluation.  Once you met with a mental health clinician and emergency room health care provider they will provide level of care recommendations and next steps.    Local Emergency Rooms can include:  [facility] ([facility]) in [facility] ([facility] ) in [facility]) in [place].  This hospital also supports EmPATH "   EmPATH (Emergency Psychiatric Assessment, Treatment, and Healing) mental health unit. EmPATH is an innovative approach to emergency mental health care that is designed to guide people safely through a crisis while helping them build coping skills for the future. The unit is designed for acute psychiatric patients to receive assessment and evaluation in a therapeutic and least restrictive setting.  [facility]) in [facility] in [facility]) in [place]         [employer]ua[employer] Safety Plan. Rosanna Lewis and William Barclay. Used with permission of the authors.           Provider Name/ Credentials:  Joann Faria MA [facility]  May 2, 2024

## 2024-05-01 ASSESSMENT — PATIENT HEALTH QUESTIONNAIRE - PHQ9
SUM OF ALL RESPONSES TO PHQ QUESTIONS 1-9: 25
10. IF YOU CHECKED OFF ANY PROBLEMS, HOW DIFFICULT HAVE THESE PROBLEMS MADE IT FOR YOU TO DO YOUR WORK, TAKE CARE OF THINGS AT HOME, OR GET ALONG WITH OTHER PEOPLE: EXTREMELY DIFFICULT
10. IF YOU CHECKED OFF ANY PROBLEMS, HOW DIFFICULT HAVE THESE PROBLEMS MADE IT FOR YOU TO DO YOUR WORK, TAKE CARE OF THINGS AT HOME, OR GET ALONG WITH OTHER PEOPLE: EXTREMELY DIFFICULT

## 2024-05-02 ENCOUNTER — VIRTUAL VISIT (OUTPATIENT)
Dept: PSYCHIATRY | Facility: CLINIC | Age: 29
End: 2024-05-02
Attending: PHYSICIAN ASSISTANT
Payer: COMMERCIAL

## 2024-05-02 ENCOUNTER — VIRTUAL VISIT (OUTPATIENT)
Dept: BEHAVIORAL HEALTH | Facility: CLINIC | Age: 29
End: 2024-05-02
Payer: COMMERCIAL

## 2024-05-02 VITALS — HEIGHT: 73 IN | BODY MASS INDEX: 23.59 KG/M2 | WEIGHT: 178 LBS

## 2024-05-02 DIAGNOSIS — F90.9 ATTENTION DEFICIT HYPERACTIVITY DISORDER (ADHD), UNSPECIFIED ADHD TYPE: Primary | ICD-10-CM

## 2024-05-02 DIAGNOSIS — F41.1 GAD (GENERALIZED ANXIETY DISORDER): ICD-10-CM

## 2024-05-02 DIAGNOSIS — F33.1 MODERATE EPISODE OF RECURRENT MAJOR DEPRESSIVE DISORDER (H): Primary | ICD-10-CM

## 2024-05-02 DIAGNOSIS — F90.9 ATTENTION DEFICIT HYPERACTIVITY DISORDER (ADHD), UNSPECIFIED ADHD TYPE: ICD-10-CM

## 2024-05-02 DIAGNOSIS — F33.1 MODERATE EPISODE OF RECURRENT MAJOR DEPRESSIVE DISORDER (H): ICD-10-CM

## 2024-05-02 PROCEDURE — 99204 OFFICE O/P NEW MOD 45 MIN: CPT | Mod: 95 | Performed by: PSYCHIATRY & NEUROLOGY

## 2024-05-02 PROCEDURE — G2211 COMPLEX E/M VISIT ADD ON: HCPCS | Mod: 95 | Performed by: PSYCHIATRY & NEUROLOGY

## 2024-05-02 PROCEDURE — 90791 PSYCH DIAGNOSTIC EVALUATION: CPT | Mod: 95 | Performed by: COUNSELOR

## 2024-05-02 RX ORDER — ATOMOXETINE 40 MG/1
40 CAPSULE ORAL DAILY
Qty: 30 CAPSULE | Refills: 1 | Status: SHIPPED | OUTPATIENT
Start: 2024-05-02 | End: 2024-05-23 | Stop reason: DRUGHIGH

## 2024-05-02 ASSESSMENT — COLUMBIA-SUICIDE SEVERITY RATING SCALE - C-SSRS
1. HAVE YOU WISHED YOU WERE DEAD OR WISHED YOU COULD GO TO SLEEP AND NOT WAKE UP?: YES
4. HAVE YOU HAD THESE THOUGHTS AND HAD SOME INTENTION OF ACTING ON THEM?: YES
5. HAVE YOU STARTED TO WORK OUT OR WORKED OUT THE DETAILS OF HOW TO KILL YOURSELF? DO YOU INTEND TO CARRY OUT THIS PLAN?: NO
REASONS FOR IDEATION LIFETIME: COMPLETELY TO END OR STOP THE PAIN (YOU COULDN'T GO ON LIVING WITH THE PAIN OR HOW YOU WERE FEELING)
ATTEMPT LIFETIME: NO
1. IN THE PAST MONTH, HAVE YOU WISHED YOU WERE DEAD OR WISHED YOU COULD GO TO SLEEP AND NOT WAKE UP?: YES
TOTAL  NUMBER OF INTERRUPTED ATTEMPTS LIFETIME: NO
TOTAL  NUMBER OF ABORTED OR SELF INTERRUPTED ATTEMPTS LIFETIME: 1
2. HAVE YOU ACTUALLY HAD ANY THOUGHTS OF KILLING YOURSELF?: NO
TOTAL  NUMBER OF ABORTED OR SELF INTERRUPTED ATTEMPTS PAST 3 MONTHS: NO
4. HAVE YOU HAD THESE THOUGHTS AND HAD SOME INTENTION OF ACTING ON THEM?: NO
6. HAVE YOU EVER DONE ANYTHING, STARTED TO DO ANYTHING, OR PREPARED TO DO ANYTHING TO END YOUR LIFE?: NO
2. HAVE YOU ACTUALLY HAD ANY THOUGHTS OF KILLING YOURSELF?: YES
REASONS FOR IDEATION PAST MONTH: COMPLETELY TO END OR STOP THE PAIN (YOU COULDN'T GO ON LIVING WITH THE PAIN OR HOW YOU WERE FEELING)
TOTAL  NUMBER OF ABORTED OR SELF INTERRUPTED ATTEMPTS LIFETIME: YES
3. HAVE YOU BEEN THINKING ABOUT HOW YOU MIGHT KILL YOURSELF?: YES

## 2024-05-02 ASSESSMENT — PAIN SCALES - GENERAL: PAINLEVEL: NO PAIN (0)

## 2024-05-02 NOTE — PROGRESS NOTES
"Virtual Visit Details    Type of service:  Video Visit   Video Start Time: {video visit start/end time for provider to select:502051}  Video End Time:{video visit start/end time for provider to select:092287}    Originating Location (pt. Location): {video visit patient location:163729::\"Home\"}  {PROVIDER LOCATION On-site should be selected for visits conducted from your clinic location or adjoining [facility] hospital, academic office, or other nearby [facility] building. Off-site should be selected for all other provider locations, including home:382656}  Distant Location (provider location):  {virtual location provider:274906}  Platform used for Video Visit: {Virtual Visit Platforms:340799::\"Balluun\"}  "

## 2024-05-02 NOTE — PATIENT INSTRUCTIONS
Treatment Plan:  Continue Lexapro/escitalopram 20 mg daily for mood, anxiety.  Discontinue Wellbutrin XL  Start Strattera/atomoxetine 40 mg daily for ADHD.  Continue psychotherapy as planned.  Continue all other cares per primary care provider.   Continue all other medications as reviewed per electronic medical record today.   Safety plan reviewed. To the Emergency Department as needed or call after hours crisis line at [phone] or [phone]. [place] Crisis Text Line: Text [place] to 832483  or  Suicide LifeLine Chat: Baileyu.org/chat  Schedule an appointment with me in 3-4 weeks or sooner as needed.  Call [facility] at [phone] to schedule.  Follow up with primary care provider as planned or sooner if needed for acute medical concerns.  Call the psychiatric nurse line with medication questions or concerns at [phone].  8fit - Fitness for the rest of ushart may be used to communicate with your provider, but this is not intended to be used for emergencies.    Patient Education:    Good ADHD resources:  Books-Mastering Adult ADHD, Driven to Distraction, Take Charge of Adult ADHD  Website-www.Mbaobao    ADHD medication expectations:   https://www.[employer]/slideshows/lbc-nsdw-gnkq-medication-work/    Care team has reviewed attendance agreement with patient. Patient advised that two failed appointments within 6 months may lead to termination of current episode of care.     Community Resources:    National Suicide Prevention Lifeline: 945.623.4004 (TTY: 400.207.1474). Call anytime for help.  (www.suicidepreventionlifeline.org)  National [place] on Mental Illness (www.rajiv.org): 963.850.5235 or 276-330-8540.   Mental Health Association (www.mentalhealth.org): 402.729.3483 or 395-527-6568.  [place] Crisis Text Line: Text [place] to 769735  Suicide LifeLine Chat: suicideSocialGO.org/chat    Patient Education   Collaborative Care Psychiatry Service  What to Expect  Here's what to expect  "from your Collaborative Care Psychiatry Service (CCPS).   About CCPS  CCPS means 2 people work together to help you get better. You'll meet with a behavioral health clinician and a psychiatric doctor. A behavioral health clinician helps people with mental health problems by talking with them. A psychiatric doctor helps people by giving them medicine.  How it works  At every visit, you'll see the behavioral health clinician (BHC) first. They'll talk with you about how you're doing and teach you how to feel better.   Then you'll see the psychiatric doctor. This doctor can help you deal with troubling thoughts and feelings by giving you medicine. They'll make sure you know the plan for your care.   CCPS usually takes 3 to 6 visits. If you need more visits, we may have you start seeing a different psychiatric doctor for ongoing care.  If you have any questions or concerns, we'll be glad to talk with you.  About visits  Be open  At your visits, please talk openly about your problems. It may feel hard, but it's the best way for us to help you.  Cancelling visits  If you can't come to your visit, please call us right away at [phone]. If you don't cancel at least 24 hours (1 full day) before your visit, that's \"late cancellation.\"  Being late to visits  Being very late is the same as not showing up. You will be a \"no show\" if:  Your appointment starts with a BHC, and you're more than 15 minutes late for a 30-minute (half hour) visit. This will also cancel your appointment with the psychiatric doctor.  Your appointment is with a psychiatric doctor only, and you're more than 15 minutes late for a 30-minute (half hour) visit.  Your appointment is with a psychiatric doctor only, and you're more than 30 minutes late for a 60-minute (full hour) visit.  If you cancel late or don't show up 2 times within 6 months, we may end your care.   Getting help between visits  If you need help between visits, you can call us Monday to " Friday from 8 a.m. to 4:30 p.m. at [phone].  Emergency care  Call 911 or go to the nearest emergency department if your life or someone else's life is in danger.  Call 988 anytime to reach the national Suicide and Crisis hotline.  Medicine refills  To refill your medicine, call your pharmacy. You can also call [facility]'s Behavioral Access at [phone], Monday to Friday, 8 a.m. to 4:30 p.m. It can take 1 to 3 business days to get a refill.   Forms, letters, and tests  You may have papers to fill out, like FMLA, short-term disability, and workability. We can help you with these forms at your visits, but you must have an appointment. You may need more than 1 visit for this, to be in an intensive therapy program, or both.  Before we can give you medicine for ADHD, we may refer you to get tested for it or confirm it another way.  We may not be able to give you an emotional support animal letter.  We don't do mental health checks ordered by the court.   We don't do mental health testing, but we can refer you to get tested.   Thank you for choosing us for your care.  For informational purposes only. Not to replace the advice of your health care provider. Copyright   2022 [facility]. All rights reserved. [employer] 878634 - 12/22.

## 2024-05-02 NOTE — PROGRESS NOTES
"Telemedicine Visit: The patient's condition can be safely assessed and treated via synchronous audio and visual telemedicine encounter.      Reason for Telemedicine Visit: Patient has requested telehealth visit    Originating Site (Patient Location): Patient's home    Distant Location (provider location):  Off-site    Consent:  The patient/guardian has verbally consented to: the potential risks and benefits of telemedicine (video visit) versus in person care; bill my insurance or make self-payment for services provided; and responsibility for payment of non-covered services.     Mode of Communication:  Video Conference via [employer]    As the provider I attest to compliance with applicable laws and regulations related to telemedicine.                                              Outpatient Psychiatric Evaluation- Standard  Adult    Name:  Rivera Paz  : 1995    Source of Referral:  Primary Care Provider: Leeroy Ewing PA-C   Current Psychotherapist: [facility]     Per primary care provider referral 2024:  My Clinical Question Is: med management, further investigation into diagnosis     Identifying Data:  Patient is a 28 year old, single  White Not  or  who presents for initial visit with me.  Patient is currently unemployed. Patient attended the phone/video session alone. Patient prefers to be called: \"Rivera\"    Chief Complaint:  Patient presents with:  Consult      HPI:  Rivera Paz is a 28 year old with past history including depression, anxiety, ADHD who presents today for psychiatric assessment.     Patient presents today for psychiatric medication evaluation.  Presenting primarily with a goal to address ADHD symptoms.  Patient diagnosed with ADHD in grade school.  Does not remember medication names but knows that he took medication for ADHD in grade school and middle school.  Looking back he does feel it is helpful but did not appreciate " "some of the side effects at that time.  May be last doing relatively okay in 2019 prior to COVID.  Has struggled much more since COVID started.  Struggling to hold a job and also feels like he is struggling more social aspects.  He is currently applying for jobs and had an interview this week.  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  He reports feeling exhausted and tired of feeling like he cannot finish something or stick with something.  Did poorly in school which affected his depression.  He is currently taking Lexapro for depression and anxiety.  Finds it helpful and well-tolerated.  Daily cannabis use and alcohol most weekends along with a few days a week typically.  Patient is working on cutting back on both since he does often feel like he is \"medicating\" with the substances.  Currently in psychotherapy.  Passive suicidal ideation with no plan or intent.  Safety planning discussed with [facility] during today's joint visit and has safety plan on file.    Psych Meds at Intake:  Lexapro 20 mg daily - thinks it has been helpful to manage mood \"somewhat\", maybe a little tired    Wellbutrin  mg daily  - started to see if helpful as non-stimulant for ADHD    Past Psych Meds:  On meds for ADHD throughout elementary and middle school - does not recall names    Per [facility] Joann Faria [facility], during today's team-based visit:   update:  ROS  Sx have been worsening.  Low motivation and energy.  Stresses:  Grief and loss of parent, difficult functioning   Appetite: Variable  Sleep: Fatigued  Outpatient Provider updates: [facility], [facility]  SI/SIB/HI:  Passive ideation current, no plan/intent; hx of aborted attempt at age 18, hx SIB  HOSSEIN:  Desire to cut back.  Drinking weekly/wkd 1-7. THC daily.  Hx of shrooms/coke.  Will reconsier CD eval in the future.  Side effects/compliance: N/a  Interventions: [facility] engaged in completing DA with psychoeducation and tx plan  Most important:  ADD meds    Past diagnoses " include: Depression, anxiety, ADHD  Current medications include: has a current medication list which includes the following prescription(s): bupropion and escitalopram.   Medication side effects: Denies  Current stressors include: Symptoms and see HPI above  Coping mechanisms and supports include: Therapy, Family, Hobbies, and Friends    Psychiatric Review of Symptoms Per [facility]ann [facility], during today's team-based visit:  Depression:     Change in sleep, Lack of interest, Excessive or inappropriate guilt, Change in energy level, Difficulties concentrating, Feelings of hopelessness, Feelings of helplessness, Low self-worth, Ruminations, Irritability, Feeling sad, down, or depressed, Withdrawn, Poor hygeine, Frequent crying, and Anger outbursts  SA:  SI: 10 years ago ER visit SI with plan to hang self  Hx of SIB teenager  Denies HI  Passive current ideation, no active plan/intent.  Chronic baseline  Future and goal motivated.  Safety plan completed.  Susi:             No Symptoms  Psychosis:       No Symptoms  Anxiety:           Excessive worry, Nervousness, Physical complaints, such as headaches, stomachaches, muscle tension, Sleep disturbance, Ruminations, Poor concentration, Irritability, and Anger outbursts  Panic:              Palpitations and Sense of impending doom  Post Traumatic Stress Disorder:  No Symptoms   Eating Disorder:          No Symptoms  ADD / ADHD:              Inattentive, Difficulties listening, Poor task completion, Poor organizational skills, Distractibility, and Forgetful  Meds [employer].  Dx as kid.  Conduct Disorder:       No symptoms  Autism Spectrum Disorder:     No symptoms  Obsessive Compulsive Disorder:       No Symptoms    All other ROS negative.     PHQ-9 scores:       4/4/2024    11:59 AM 4/12/2024     9:21 AM 5/1/2024     5:33 PM   PHQ-9 SCORE   PHQ-9 Total Score MyChart  18 (Moderately severe depression) 25 (Severe depression)   PHQ-9 Total Score 20 18 25    25  "      LUIS MIGUEL-7 scores:        4/4/2024    11:59 AM 4/12/2024     9:22 AM   LUIS MIGUEL-7 SCORE   Total Score  14 (moderate anxiety)   Total Score 16 14       Medical Review of Systems:  10 systems (general, cardiovascular, respiratory, eyes, ENT, endocrine, GI, , M/S, neurological) were reviewed. Most pertinent finding(s) is/are: denies fever, cough, persistent headaches, shortness of breath, chest pain, severe GI symptoms, trouble urinating, severe pain. The remaining systems are all unremarkable.    A 12-item WHODAS 2.0 assessment was not completed.    Psychiatric History:   Hospitalizations:  Yes-  2014 at [facility] at age 18 for SI after break-up with GF  History of Commitment? No   Past Treatment: counseling, inpatient mental health services, medication(s) from physician / PCP, and psychiatry  Suicide Attempts: preparation but then called friend 2014   Current Suicide Risk: Suicide Assessment Completed Today.  Self-injurious Behavior: hx of cutting; none since 17 yo  Electroconvulsive Therapy (ECT) or Transcranial Magnetic Stimulation (TMS): No   GeneSight Genetic Testing: No     Past medication trials include but are not limited to:   Psych Meds at Intake:  Lexapro 20 mg daily - thinks it has been helpful to manage mood \"somewhat\", maybe a little tired    Wellbutrin  mg daily  - started to see if helpful as non-stimulant for ADHD    Past Psych Meds:  On meds for ADHD throughout elementary and middle school - does not recall names    Substance Use History:  Current Use of Drugs/Alcohol: ETOH: most weekends and a few days a week 1-7, \"medicating more with that\" - stopped for a few weeks at beg of year; Cannabis: smokes about 1/2 oz in two weeks- usu bowls or water bongs  Past Use of Drugs/Alcohol: hx of social cocaine use - last around 2017; LSD/psilocybin/hallucinogens socially/festivals last around 2/2023  Patient reported the following problems as a result of drinking and drug use: academic and relationship " problems.   Patient has not received chemical dependency treatment in the past  Recovery Programming Involvement: None    Tobacco use: Yes Cigarettes       Based on the clinical interview, there   are indications of possible substance abuse/dependence . Continue to monitor.   Discussed effect of substance use on overall health.     Past Medical History:  Past Medical History:   Diagnosis Date    Depressive disorder       Surgery: No past surgical history on file.  Food and Medicine Allergies:     Allergies   Allergen Reactions    Cats      Seizures or Head Injury: No  Diet: No Restrictions  Exercise: tries to workout at home for 30 minutes  Supplements: Reviewed per Electronic Medical Record Today    Current Medications:    Current Outpatient Medications:     buPROPion (WELLBUTRIN XL) 150 MG 24 hr tablet, TAKE 1 TABLET BY MOUTH EVERY MORNING.; PATIENT DUE FOR APPOINTMENT(PHYSICAL), Disp: 90 tablet, Rfl: 3    escitalopram (LEXAPRO) 20 MG tablet, TAKE 1 TABLET BY MOUTH DAILY( PATIENT DUE FOR APPOITMENT), Disp: 90 tablet, Rfl: 3      Vital Signs:  None since this is a phone/video visit.     Labs:  Most recent laboratory results reviewed and the pertinent results include:   Office Visit on 04/12/2024   Component Date Value Ref Range Status    Sodium 04/12/2024 138  135 - 145 mmol/L Final    Reference intervals for this test were updated on 09/26/2023 to more accurately reflect our healthy population. There may be differences in the flagging of prior results with similar values performed with this method. Interpretation of those prior results can be made in the context of the updated reference intervals.     Potassium 04/12/2024 4.1  3.4 - 5.3 mmol/L Final    Carbon Dioxide (CO2) 04/12/2024 24  22 - 29 mmol/L Final    Anion Gap 04/12/2024 11  7 - 15 mmol/L Final    Urea Nitrogen 04/12/2024 10.4  6.0 - 20.0 mg/dL Final    Creatinine 04/12/2024 0.92  0.67 - 1.17 mg/dL Final    GFR Estimate 04/12/2024 >90  >60 mL/min/1.73m2  Final    Calcium 04/12/2024 9.5  8.6 - 10.0 mg/dL Final    Chloride 04/12/2024 103  98 - 107 mmol/L Final    Glucose 04/12/2024 103 (H)  70 - 99 mg/dL Final    Alkaline Phosphatase 04/12/2024 62  40 - 150 U/L Final    Reference intervals for this test were updated on 11/14/2023 to more accurately reflect our healthy population. There may be differences in the flagging of prior results with similar values performed with this method. Interpretation of those prior results can be made in the context of the updated reference intervals.    AST 04/12/2024 31  0 - 45 U/L Final    Reference intervals for this test were updated on 6/12/2023 to more accurately reflect our healthy population. There may be differences in the flagging of prior results with similar values performed with this method. Interpretation of those prior results can be made in the context of the updated reference intervals.    ALT 04/12/2024 21  0 - 70 U/L Final    Reference intervals for this test were updated on 6/12/2023 to more accurately reflect our healthy population. There may be differences in the flagging of prior results with similar values performed with this method. Interpretation of those prior results can be made in the context of the updated reference intervals.      Protein Total 04/12/2024 7.2  6.4 - 8.3 g/dL Final    Albumin 04/12/2024 4.7  3.5 - 5.2 g/dL Final    Bilirubin Total 04/12/2024 0.3  <=1.2 mg/dL Final    Vitamin D, Total (25-Hydroxy) 04/12/2024 46  20 - 50 ng/mL Final    optimum levels    Cholesterol 04/12/2024 223 (H)  <200 mg/dL Final    Triglycerides 04/12/2024 113  <150 mg/dL Final    Direct Measure HDL 04/12/2024 51  >=40 mg/dL Final    LDL Cholesterol Calculated 04/12/2024 149 (H)  <=100 mg/dL Final    Non HDL Cholesterol 04/12/2024 172 (H)  <130 mg/dL Final    Patient Fasting > 8hrs? 04/12/2024 No   Final    TSH 04/12/2024 1.17  0.30 - 4.20 uIU/mL Final    WBC Count 04/12/2024 6.8  4.0 - 11.0 10e3/uL Final    RBC Count  2024 4.58  4.40 - 5.90 10e6/uL Final    Hemoglobin 2024 15.2  13.3 - 17.7 g/dL Final    Hematocrit 2024 43.8  40.0 - 53.0 % Final    MCV 2024 96  78 - 100 fL Final    MCH 2024 33.2 (H)  26.5 - 33.0 pg Final    MCHC 2024 34.7  31.5 - 36.5 g/dL Final    RDW 2024 12.1  10.0 - 15.0 % Final    Platelet Count 2024 194  150 - 450 10e3/uL Final    % Neutrophils 2024 53  % Final    % Lymphocytes 2024 37  % Final    % Monocytes 2024 7  % Final    % Eosinophils 2024 2  % Final    % Basophils 2024 0  % Final    % Immature Granulocytes 2024 0  % Final    Absolute Neutrophils 2024 3.6  1.6 - 8.3 10e3/uL Final    Absolute Lymphocytes 2024 2.5  0.8 - 5.3 10e3/uL Final    Absolute Monocytes 2024 0.5  0.0 - 1.3 10e3/uL Final    Absolute Eosinophils 2024 0.1  0.0 - 0.7 10e3/uL Final    Absolute Basophils 2024 0.0  0.0 - 0.2 10e3/uL Final    Absolute Immature Granulocytes 2024 0.0  <=0.4 10e3/uL Final     No EKG on file.     Family History:   Patient reported family history includes:   Family History   Problem Relation Age of Onset    Depression Mother     Depression Father      Mental Illness History: Yes: Per EPIC Electronic Medical Record  Substance Abuse History: Denies  Suicide History: Yes: father when pt was age 11  Medications: Unknown     Social History Per [facility] Joann F[facility], during today's team-based visit:  Patient reported they grew up in [place]  .  They were raised by biological parents  .  Parents  / .  Patient reported that their childhood was happy for the most part, dad  at age 11, no abuse concerns, cared for.  Patient described their current relationships with family of origin as 2 half sisters- doesn't see as often, they are good.  Good relationship with mom. Father is  from suicide.     The patient describes their cultural background as .   Cultural influences and impact on patient's life structure, values, norms, and healthcare: [place].  Contextual influences on patient's health include: N/a.    These factors will be addressed in the Preliminary Treatment plan. Patient identified their preferred language to be English. Patient reported they does not need the assistance of an  or other support involved in therapy.      Patient reported had no significant delays in developmental tasks.   Patient's highest education level was high school graduate  .  Patient identified the following learning problems: attention and concentration.  Modifications will not be used to assist communication in therapy.  Patient reports they are  able to understand written materials.     Patient reported the following relationship history never .  Patient's current relationship status is single for 5 years.   Patient identified their sexual orientation as heterosexual.  Patient reported having 0 child(ele). Patient identified mother; therapist as part of their support system.  Patient identified the quality of these relationships as fair,  .       Patient's current living/housing situation involves staying with someone lives with mom.  The immediate members of family and household include Farhan Wang,Kathryn  and they report that housing is stable.     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  Patient reports their finances are obtained through parents; family; general assistance. Patient does identify finances as a current stressor.      Firearms/Weapons Access: No: Patient denies   Service: No    Legal History:  No: Patient denies any legal history    Significant Losses / Trauma / Abuse / Neglect Issues:  There are indications or report of significant loss, trauma, abuse or neglect issues related to: see HPI and social hx above .   Issues of possible neglect are not present.       Comprehensive Examination (limited due to  virtual visit format, phone/video):  Vital Signs:  Vitals: There were no vitals taken for this visit.  General/Constitutional:  Appearance: awake, alert, adequately groomed, appeared stated age and no apparent distress  Attitude:  cooperative   Eye Contact:  good  Musculoskeletal:  Muscle Strength and Tone: no gross abnormalities by observation  Psychomotor Behavior:  no evidence of tardive dyskinesia, dystonia, or tics  Gait and Station: normal, no gross abnormalities noted by observation  Psychiatric:  Speech:  clear, coherent, regular rate, rhythm, and volume  Associations:  no loose associations  Thought Process:  logical, linear and goal oriented  Thought Content:  passive suicidal ideation with no plan or intent, no homicidal ideation, no evidence of psychotic thought, no auditory hallucinations present and no visual hallucinations present  Mood:  anxious  Affect:  constricted mobility  Insight:  good  Judgment:  intact, adequate for safety  Impulse Control:  intact  Neurological:  Oriented to:  person, place, time, and situation  Attention Span and Concentration:  normal  Language: intact  Recent and Remote Memory:  Intact to interview. Not formally assessed. No amnesia.  Fund of Knowledge: appropriate    Strengths and Opportunities Per [facility] Joann Faria [facility], during today's team-based visit:  Patient identified the following strengths or resources that will help them succeed in treatment: friends / good social support, family support, insight, and intelligence. Things that may interfere with the patient's success in treatment include: financial hardship.     Suicide Risk Assessment:  Today Rivera Paz reports passive suicidal thoughts. In addition, there are notable risk factors for self-harm, including single status, anxiety, substance abuse, previous history of suicide attempts, suicidal ideation, purposelessness/no reason for living, withdrawing, mood change, and hx family suicide . However, risk  is mitigated by commitment to family, ability to volunteer a safety plan, history of seeking help when needed, future oriented, no access to firearms or weapons, and denies suicidal intent or plan. Therefore, based on all available evidence including the factors cited above, Rivera Paz does not appear to be at imminent risk for self-harm, does not meet criteria for a 72-hr hold, and therefore remains appropriate for ongoing outpatient level of care.  A thorough assessment of risk factors related to suicide and self-harm have been reviewed and are noted above. The patient convincingly denies acute suicidality on several occasions. Local community safety resources were provided for patient to use if needed. There was no deceit detected, and the patient presented in a manner that was believable.     DSM5  Diagnosis:  Attention-Deficit/Hyperactivity Disorder  314.01 (F90.9) Unspecified Attention -Deficit / Hyperactivity Disorder  296.32 (F33.1) Major Depressive Disorder, Recurrent Episode, Moderate _  300.02 (F41.1) Generalized Anxiety Disorder    Medical Comorbidities Include:   Patient Active Problem List    Diagnosis Date Noted    Moderate episode of recurrent major depressive disorder (H) 10/18/2018     Priority: Medium       Impression:  Rivera Paz is a 28-year-old male with past psychiatric history including depression, anxiety, ADHD who presents today for psychiatric evaluation.  Patient with long history mental health struggles.  Struggled with ADHD during adolescence.  One psychiatric hospitalization at age 18 for suicidal ideation after a break-up with a girlfriend.  Patient has found Lexapro helpful for managing mood symptoms but has struggled more recently with ADHD symptoms.  Has tried to manage off ADHD medications for quite a while.  Struggling with task initiation, sustained attention, task completion.  Patient is working to cut back on alcohol and cannabis use.  Discussed due to daily  cannabis use, and often more conservative prescribing practices within primary care, I am willing to pursue ADHD medication trials with nonstimulant medication at this time.  Patient is agreeable to discontinuing Wellbutrin XL since it has not been very helpful for ADHD.  We will start trial with Strattera/atomoxetine.  Recommend continue in psychotherapy.  Could consider further chemical dependency resources if indicated/patient desires.  No acute safety concerns.  No acute suicidality.  Passive suicidal ideation-safety planning discussed with [facility] today.  Safety plan on file.    Medication side effects and alternatives reviewed. Health promotion activities recommended and reviewed today. All questions addressed. Education and counseling completed regarding risks and benefits of medications and psychotherapy options. Recommend therapy for additional support.     Treatment Plan:  Continue Lexapro/escitalopram 20 mg daily for mood, anxiety.  Discontinue Wellbutrin XL  Start Strattera/atomoxetine 40 mg daily for ADHD.  Continue psychotherapy as planned.  Continue all other cares per primary care provider.   Continue all other medications as reviewed per electronic medical record today.   Safety plan reviewed. To the Emergency Department as needed or call after hours crisis line at [phone] or [phone]. [place] Crisis Text Line: Text [place] to 022688  or  Suicide LifeLine Chat: suicidepreventionlifeline.org/chat  Schedule an appointment with me in 3-4 weeks or sooner as needed.  Call [facility] at [phone] to schedule.  Follow up with primary care provider as planned or sooner if needed for acute medical concerns.  Call the psychiatric nurse line with medication questions or concerns at [phone].  [employer] may be used to communicate with your provider, but this is not intended to be used for emergencies.    Patient Education:    Good ADHD resources:  Books-Mastering Adult ADHD, Driven to  Distraction, Take Charge of Adult ADHD  Website-www.[facility].[employer]    ADHD medication expectations:   https://www.[employer]/slideshows/olo-ttgm-fjtw-medication-work/    Care team has reviewed attendance agreement with patient. Patient advised that two failed appointments within 6 months may lead to termination of current episode of care.     Community Resources:    National Suicide Prevention Lifeline: 171.330.3690 (TTY: 200.188.5060). Call anytime for help.  (www.suicidepreventionlifeline.org)  National [place] on Mental Illness (www.rajiv.org): 447.694.8443 or 106-452-3224.   Mental Health Association (www.mentalhealth.org): 892.483.9449 or 171-148-4287.  [place] Crisis Text Line: Text [place] to 180182  Suicide LifeLine Chat: [employer]org/chat    Administrative Billing:   Phone Call/Video Duration: 17 Minutes  Start: 9:09a  Stop: 9:26a    Episode of Care #: 1    Patient Status:  Patient will continue to be seen for ongoing consultation and stabilization.    Signed:   Prachi Fisher DO  [facility]    Disclaimer: This note consists of symbols derived from keyboarding, dictation and/or voice recognition software. As a result, there may be errors in the script that have gone undetected. Please consider this when interpreting information found in this chart.

## 2024-05-20 NOTE — PROGRESS NOTES
[facility] Psychiatry Services [facility]  May 23, 2024      Behavioral Health Clinician Progress Note    Patient Name: Rivera Paz           Service Type:  Individual      Service Location:   [facility] / Email (patient reached)     Session Start Time: 801am  Session End Time: 817am      Session Length: 16 - 37      Attendees: Client     Service Modality:  Video Visit:      Provider verified identity through the following two step process.  Patient provided:  Patient is known previously to provider    Telemedicine Visit: The patient's condition can be safely assessed and treated via synchronous audio and visual telemedicine encounter.      Reason for Telemedicine Visit: Services only offered telehealth    Originating Site (Patient Location): Patient's home    Distant Site (Provider Location): Provider Remote Setting- Home Office    Consent:  The patient/guardian has verbally consented to: the potential risks and benefits of telemedicine (video visit) versus in person care; bill my insurance or make self-payment for services provided; and responsibility for payment of non-covered services.     Patient would like the video invitation sent by:  My Chart    Mode of Communication:  Video Conference via [facility]    Distant Location (Provider):  Off-site    As the provider I attest to compliance with applicable laws and regulations related to telemedicine.    Visit Activities (Refresh list every visit): [facility] Only    Diagnostic Assessment Date: 5/2/24 Joann Faria MA [facility]  Treatment Plan Review Date: 5/23/24  See Flowsheets for today's PHQ-9 and LUIS MIGUEL-7 results  Previous PHQ-9:       4/4/2024    11:59 AM 4/12/2024     9:21 AM 5/1/2024     5:33 PM   PHQ-9 SCORE   PHQ-9 Total Score MyChart  18 (Moderately severe depression) 25 (Severe depression)   PHQ-9 Total Score 20 18 25    25     Previous LUIS MIGUEL-7:       4/4/2024    11:59 AM 4/12/2024     9:22 AM   LUIS MIGUEL-7 SCORE   Total Score  14 (moderate anxiety)    Total Score 16 14       DATA  Extended Session (60+ minutes): No  Interactive Complexity: No  Crisis: No  [facility] Patient: No    Treatment Objective(s) Addressed in This Session:  Target Behavior(s): disease management/lifestyle changes mgmt of ADHD    Attention Problems: will develop coping skills to effectively manage attention issues    Current Stressors / Issues:  MH update:  Started strattera.  Feeling better.  More motivation around the house.  Actively applying for jobs. More energy.  Depression /anxiety sx reduced to less then half.  Being active, garden/work out  Stresses:  Being social is tough/driving  Appetite: No changes  Sleep: Schedule is not great, but fatigue is better.  Outpatient Provider updates: Jasmine Rubio  SI/SIB/HI: Passive ideation current, no plan/intent; hx of aborted attempt at age 18, hx SIB  HOSSEIN: Trying to cut back.   THC daily (quaintly has reduced).  Drinking less, tried a week of sobriety.  Side effects/compliance: N/a  Interventions:  [facility] engaged in goal exploration  Most important:  up on dose? Having some wear off in the afternoon    5/2  Current Stressors / Issues:  MH update:  ROS above.  Sx have been worsening.  Low motivation and energy.  Stresses:  Grief and loss of parent, difficult functioning   Appetite: Variable  Sleep: Fatigued  Outpatient Provider updates: Jasmine Rubio  SI/SIB/HI:  Passive ideation current, no plan/intent; hx of aborted attempt at age 18, hx SIB  HOSSEIN:  Desire to cut back.  Drinking weekly/wkd 1-7. THC daily.  Hx of shrooms/coke.  Will reconsier CD eval in the future.  Side effects/compliance: N/a  Interventions: [facility] engaged in completing DA with psychoeducation and tx plan  Most important:  ADD meds      Progress on Treatment Objective(s) / Homework:  New Objective established this session - CONTEMPLATION (Considering change and yet undecided); Intervened by assessing the negative and positive thinking (ambivalence) about  behavior change    Motivational Interviewing    MI Intervention: Co-Developed Goal: mgmt of sx      Change Talk Expressed by the Patient: Desire to change Ability to change    Provider Response to Change Talk: E - Evoked more info from patient about behavior change and A - Affirmed patient's thoughts, decisions, or attempts at behavior change    Also provided psychoeducation about behavioral health condition, symptoms, and treatment options    Assessments completed prior to visit:  N/a    Care Plan review completed: Yes    Medication Review:  Changes to psychiatric medications, see updated Medication List in EPIC.     Medication Compliance:  Yes    Changes in Health Issues:   None reported    Chemical Use Review:   Substance Use: Problem use continues with no change since last session, Stage of Change: Pre-contemplation        Tobacco Use: No change in amount of tobacco use since last session.  Patient declined discussion at this time    Assessment: Current Emotional / Mental Status (status of significant symptoms):  Risk status (Self / Other harm or suicidal ideation)  Patient has had a history of suicidal ideation: chronic current passive ideation, suicide attempts: hx of aborted attempt at 18 via hanging, and self-injurious behavior: hx of self harm  Patient denies current fears or concerns for personal safety.  Patient denies current or recent suicidal ideation or behaviors.  Patient denies current or recent homicidal ideation or behaviors.  Patient denies current or recent self injurious behavior or ideation.  Patient denies other safety concerns.  A safety and risk management plan has been developed including: Patient consented to co-developed safety plan.  A safety and risk management plan was completed.  Patient agreed to use safety plan should any safety concerns arise.  A copy was given to the patient.    Appearance:   Appropriate   Eye Contact:   Good   Psychomotor Behavior: Normal    Attitude:   Cooperative   Orientation:   All  Speech   Rate / Production: Normal    Volume:  Normal   Mood:    Normal  Affect:    Appropriate   Thought Content:  Clear   Thought Form:  Coherent  Logical   Insight:    Good     Diagnoses:  1. Moderate episode of recurrent major depressive disorder (H)    2. LUIS MIGUEL (generalized anxiety disorder)    3. Attention deficit hyperactivity disorder (ADHD), unspecified ADHD type        Collateral Reports Completed:  Communicated with: Dr Fisher    Plan: (Homework, other):  Patient was given information about behavioral services and encouraged to schedule a follow up appointment with the clinic [facility] as needed.  He was also given information about mental health symptoms and treatment options .  CD Recommendations: Practice Harm Reduction: reducing use .       Joann Faria [facility]    ______________________________________________________________________    Integrated Primary Care Behavioral Health Treatment Plan    Patient's Name: Rivera Paz  YOB: 1995    Date of Creation: 5/23/24  Date Treatment Plan Last Reviewed/Revised: 5/23/24  1. Moderate episode of recurrent major depressive disorder (H)    2. LUIS MIGUEL (generalized anxiety disorder)    3. Attention deficit hyperactivity disorder (ADHD), unspecified ADHD type      DSM5 Diagnoses:   Psychosocial / Contextual Factors: grief and loss  PROMIS (reviewed every 90 days):    PROMIS-10 Scores      4/25/2024    12:35 PM   PROMIS-10 Scores Only   Global Mental Health Score 7    7   Global Physical Health Score 12    12   PROMIS TOTAL - SUBSCORES 19    19         Referral / Collaboration:  Referral to another professional/service is not indicated at this time..    Anticipated number of session for this episode of care: 5-6  Anticipation frequency of session: Monthly  Anticipated Duration of each session: 16-37 minutes  Treatment plan will be reviewed in 90 days or when goals have been changed.       MeasurableTreatment  Goal(s) related to diagnosis / functional impairment(s)  Goal 1: Patient will build ADHD skills    I will know I've met my goal when I feel more motivation and go out.      Objective #A (Patient Action)    Patient will use cognitive strategies identified in therapy to challenge anxious thoughts.  Status: New - Date: 5/23/24      Intervention(s)  Therapist will provide support through CBT, MI, Acceptance and Commitment Therapy, Dialectic Behavioral Therapy and problem solving model to explore and overcome barriers.    Goal 2: Patient will manage concerns of safety    I will know I've met my goal when I can reduce suicidal ideation .      Objective #A (Patient Action)    Patient will use previously developed safety plan on file.  Status: New - Date: 5/23/24     Intervention(s)  Therapist will provide support through CBT, MI, Acceptance and Commitment Therapy, Dialectic Behavioral Therapy and problem solving model to explore and overcome barriers.      Patient has reviewed and agreed to the above plan.      Joann Faria, [facility]  May 23, 2024

## 2024-05-23 ENCOUNTER — VIRTUAL VISIT (OUTPATIENT)
Dept: BEHAVIORAL HEALTH | Facility: CLINIC | Age: 29
End: 2024-05-23
Payer: COMMERCIAL

## 2024-05-23 ENCOUNTER — VIRTUAL VISIT (OUTPATIENT)
Dept: PSYCHIATRY | Facility: CLINIC | Age: 29
End: 2024-05-23
Payer: COMMERCIAL

## 2024-05-23 VITALS — WEIGHT: 178 LBS | HEIGHT: 73 IN | BODY MASS INDEX: 23.59 KG/M2

## 2024-05-23 DIAGNOSIS — F41.1 GAD (GENERALIZED ANXIETY DISORDER): ICD-10-CM

## 2024-05-23 DIAGNOSIS — F33.1 MODERATE EPISODE OF RECURRENT MAJOR DEPRESSIVE DISORDER (H): ICD-10-CM

## 2024-05-23 DIAGNOSIS — F33.1 MODERATE EPISODE OF RECURRENT MAJOR DEPRESSIVE DISORDER (H): Primary | ICD-10-CM

## 2024-05-23 DIAGNOSIS — F90.9 ATTENTION DEFICIT HYPERACTIVITY DISORDER (ADHD), UNSPECIFIED ADHD TYPE: Primary | ICD-10-CM

## 2024-05-23 DIAGNOSIS — F90.9 ATTENTION DEFICIT HYPERACTIVITY DISORDER (ADHD), UNSPECIFIED ADHD TYPE: ICD-10-CM

## 2024-05-23 PROCEDURE — 99214 OFFICE O/P EST MOD 30 MIN: CPT | Mod: 95 | Performed by: PSYCHIATRY & NEUROLOGY

## 2024-05-23 PROCEDURE — 90832 PSYTX W PT 30 MINUTES: CPT | Mod: 95 | Performed by: COUNSELOR

## 2024-05-23 PROCEDURE — G2211 COMPLEX E/M VISIT ADD ON: HCPCS | Mod: 95 | Performed by: PSYCHIATRY & NEUROLOGY

## 2024-05-23 RX ORDER — ATOMOXETINE 60 MG/1
60 CAPSULE ORAL DAILY
Qty: 30 CAPSULE | Refills: 1 | Status: SHIPPED | OUTPATIENT
Start: 2024-05-23 | End: 2024-07-18

## 2024-05-23 RX ORDER — ATOMOXETINE 40 MG/1
40 CAPSULE ORAL DAILY
Qty: 30 CAPSULE | Refills: 1 | Status: CANCELLED | OUTPATIENT
Start: 2024-05-23

## 2024-05-23 ASSESSMENT — PAIN SCALES - GENERAL: PAINLEVEL: NO PAIN (0)

## 2024-05-23 NOTE — PROGRESS NOTES
"Telemedicine Visit: The patient's condition can be safely assessed and treated via synchronous audio and visual telemedicine encounter.      Reason for Telemedicine Visit: Patient has requested telehealth visit    Originating Site (Patient Location): Patient's home    Distant Location (provider location):  Off-site    Consent:  The patient/guardian has verbally consented to: the potential risks and benefits of telemedicine (video visit) versus in person care; bill my insurance or make self-payment for services provided; and responsibility for payment of non-covered services.     Mode of Communication:  Video Conference via [employer]    As the provider I attest to compliance with applicable laws and regulations related to telemedicine.         Outpatient Psychiatric Progress Note    Name: Rivera Delacruz Jackson   : 1995                    Primary Care Provider: Leeroy Ewing PA-C   Therapist: Felecia Eastman     PHQ-9 scores:      2024    11:59 AM 2024     9:21 AM 2024     5:33 PM   PHQ-9 SCORE   PHQ-9 Total Score MyChart  18 (Moderately severe depression) 25 (Severe depression)   PHQ-9 Total Score 20 18 25    25       LUIS MIGUEL-7 scores:      2024    11:59 AM 2024     9:22 AM   LUIS MIGUEL-7 SCORE   Total Score  14 (moderate anxiety)   Total Score 16 14       Patient Identification:  Patient is a 28 year old, single  White Not  or  male  who presents for return visit with me.  Patient is currently unemployed. Patient attended the phone/video session alone. Patient prefers to be called: \"Rivera\".    Interim History:  I last saw Rivera Jayme Jackson for outpatient psychiatry Consultation on 2024. During that appointment, we:    Continue Lexapro/escitalopram 20 mg daily for mood, anxiety.  Discontinue Wellbutrin XL  Start Strattera/atomoxetine 40 mg daily for ADHD.  Continue psychotherapy as planned.    : Patient overall with some improvements in symptoms since " "starting Strattera.  Denies any major negative side effects.  No worsening symptoms off Wellbutrin.  Interested in possible dose increase of Strattera.  Mood a little better.  Anxiety more manageable.  Continues in psychotherapy.  No acute safety concerns.  No SI.  Reports reducing use of cannabis.    Per [facility], Joann Faria [facility], during today's team-based visit:  MH update:  ROS above.  Sx have been worsening.  Low motivation and energy.  Stresses:  Grief and loss of parent, difficult functioning   Appetite: Variable  Sleep: Fatigued  Outpatient Provider updates: Felecia Crawley, [facility]  SI/SIB/HI:  Passive ideation current, no plan/intent; hx of aborted attempt at age 18, hx SIB  HOSSEIN:  Desire to cut back.  Drinking weekly/wkd 1-7. THC daily.  Hx of shrooms/coke.  Will reconsier CD eval in the future.  Side effects/compliance: N/a  Interventions: [facility] engaged in completing DA with psychoeducation and tx plan  Most important:  ADD meds    Past Psychiatric Med Trials:  Psych Meds at Intake:  Lexapro 20 mg daily - thinks it has been helpful to manage mood \"somewhat\", maybe a little tired    Wellbutrin  mg daily  - started to see if helpful as non-stimulant for ADHD     Past Psych Meds:  On meds for ADHD throughout elementary and middle school - does not recall names    Psychiatric ROS:  See HPI above for all pertinent positives and negatives. Rest of systems negative.     PHQ9 and GAD7 scores were reviewed today if completed.   Medication side effects: Denies  Current stressors include: Symptoms and see HPI above  Coping mechanisms and supports include: Therapy, Family, and Hobbies    Current medications include:   Current Outpatient Medications   Medication Sig Dispense Refill    atomoxetine (STRATTERA) 40 MG capsule Take 1 capsule (40 mg) by mouth daily 30 capsule 1    escitalopram (LEXAPRO) 20 MG tablet TAKE 1 TABLET BY MOUTH DAILY( PATIENT DUE FOR APPOITMENT) 90 tablet 3     No current " facility-administered medications for this visit.         Past Medical/Surgical History:  Past Medical History:   Diagnosis Date    Depressive disorder       has a past medical history of Depressive disorder.    He has no past medical history of Arthritis, Cancer (H), Cerebral infarction (H), Congestive heart failure (H), COPD (chronic obstructive pulmonary disease) (H), Diabetes (H), Heart disease, History of blood transfusion, Hypertension, Thyroid disease, or Uncomplicated asthma.    Social History:  Reviewed. No changes to social history except as noted above in HPI.    Vital Signs:   None. This is phone/video visit.     Labs:  Most recent laboratory results reviewed and no new labs.     Review of Systems:  10 systems (general, cardiovascular, respiratory, eyes, ENT, endocrine, GI, , M/S, neurological) were reviewed. Most pertinent finding(s) is/are:  denies fever, cough, persistent headaches, shortness of breath, chest pain, severe GI symptoms, trouble urinating, severe pain. The remaining systems are all unremarkable.    Mental Status Examination (limited as this is by phone/video):  Appearance: Awake, alert, appears stated age, no acute distress, well-groomed   Attitude:  cooperative  Motor: No gross abnormalities observed via video, not formally tested   Oriented to:  person, place, time, and situation  Attention Span and Concentration:  normal  Speech:  clear, coherent, regular rate, rhythm, and volume  Language: intact  Mood:  better  Affect:  intensity is flat  Associations:  no loose associations  Thought Process:  logical, linear and goal oriented  Thought Content:  no evidence of suicidal ideation or homicidal ideation, no evidence of psychotic thought, no auditory hallucinations present and no visual hallucinations present  Recent and Remote Memory:  Intact to interview. Not formally assessed. No amnesia.  Fund of Knowledge: appropriate  Insight:  good  Judgment:  intact, adequate for  safety  Impulse Control:  intact    Suicide Risk Assessment:  Today Rivera Paz reports no suicidal ideation. Based on all available evidence including the factors cited above, Rivera Paz does not appear to be at imminent risk for self-harm, does not meet criteria for a 72-hr hold, and therefore remains appropriate for ongoing outpatient level of care.  A thorough assessment of risk factors related to suicide and self-harm have been reviewed and are noted above. The patient convincingly denies suicidality on several occasions. Local community safety resources reviewed for patient to use if needed. There was no deceit detected, and the patient presented in a manner that was believable.     DSM5 Diagnosis:  Attention-Deficit/Hyperactivity Disorder  314.01 (F90.9) Unspecified Attention -Deficit / Hyperactivity Disorder  296.32 (F33.1) Major Depressive Disorder, Recurrent Episode, Moderate _  300.02 (F41.1) Generalized Anxiety Disorder    Medical comorbidities include:   Patient Active Problem List    Diagnosis Date Noted    Moderate episode of recurrent major depressive disorder (H) 10/18/2018     Priority: Medium       Psychosocial & Contextual Factors: see HPI above    Assessment:  From Intake, 5/2/2024:  Rivera Paz is a 28-year-old male with past psychiatric history including depression, anxiety, ADHD who presents today for psychiatric evaluation.  Patient with long history mental health struggles.  Struggled with ADHD during adolescence.  One psychiatric hospitalization at age 18 for suicidal ideation after a break-up with a girlfriend.  Patient has found Lexapro helpful for managing mood symptoms but has struggled more recently with ADHD symptoms.  Has tried to manage off ADHD medications for quite a while.  Struggling with task initiation, sustained attention, task completion.  Patient is working to cut back on alcohol and cannabis use.  Discussed due to daily cannabis use, and often  more conservative prescribing practices within primary care, I am willing to pursue ADHD medication trials with nonstimulant medication at this time.  Patient is agreeable to discontinuing Wellbutrin XL since it has not been very helpful for ADHD.  We will start trial with Strattera/atomoxetine.  Recommend continue in psychotherapy.  Could consider further chemical dependency resources if indicated/patient desires.  No acute safety concerns.  No acute suicidality.  Passive suicidal ideation-safety planning discussed with [facility] today.  Safety plan on file.     5/23/2024:  Patient overall doing quite well on Strattera so far.  Could use additional benefit so we will further optimize dose.  No major negative side effects.  Has reduced cannabis use.  No acute safety concerns.  No SI.  Continues in psychotherapy.    Medication side effects and alternatives were reviewed. Health promotion activities recommended and reviewed today. All questions addressed. Education and counseling completed regarding risks and benefits of medications and psychotherapy options. Recommend therapy for additional support.     Treatment Plan:  Increase Strattera/atomoxetine to 60 mg daily for ADHD.  Continue Lexapro/escitalopram 20 mg daily for mood, anxiety.   Continue all other cares per primary care provider.   Continue all other medications as reviewed per electronic medical record today.   Safety plan reviewed. To the Emergency Department as needed or call after hours crisis line at [phone] or [phone]. [place] Crisis Text Line. Text [place] to 340919 or Suicide LifeLine Chat: suicidepreventionlifeline.org/chat  Continue therapy as planned.   Schedule an appointment with me in 2 months or sooner as needed. Call [facility] at [phone] to schedule.  Follow up with primary care provider as planned or for acute medical concerns.  Call the psychiatric nurse line with medication questions or concerns at  [phone].  [email or web address] may be used to communicate with your provider, but this is not intended to be used for emergencies.    Administrative Billing:   Phone Call/Video Duration: 6 Minutes  Start: 8:41a  Stop: 8:47a    Episode of Care #: 2    Patient Status:  Patient will continue to be seen for ongoing consultation and stabilization.    Signed:   Prachi Fisher DO  [facility]    Disclaimer: This note consists of symbols derived from keyboarding, dictation and/or voice recognition software. As a result, there may be errors in the script that have gone undetected. Please consider this when interpreting information found in this chart.

## 2024-05-23 NOTE — NURSING NOTE
Is the patient currently in the state of [place]? YES    Visit mode:VIDEO    If the visit is dropped, the patient can be reconnected by: VIDEO VISIT: Send to e-mail at: [email or web address]    Will anyone else be joining the visit? NO  (If patient encounters technical issues they should call [phone]    How would you like to obtain your AVS? MyChart    Are changes needed to the allergy or medication list? No    Are refills needed on medications prescribed by this physician? YES    Reason for visit: RECHECK    Malaika ROA

## 2024-07-18 ENCOUNTER — MYC REFILL (OUTPATIENT)
Dept: PSYCHIATRY | Facility: CLINIC | Age: 29
End: 2024-07-18
Payer: COMMERCIAL

## 2024-07-18 DIAGNOSIS — F90.9 ATTENTION DEFICIT HYPERACTIVITY DISORDER (ADHD), UNSPECIFIED ADHD TYPE: ICD-10-CM

## 2024-07-18 RX ORDER — ATOMOXETINE 60 MG/1
60 CAPSULE ORAL DAILY
Qty: 30 CAPSULE | Refills: 0 | Status: SHIPPED | OUTPATIENT
Start: 2024-07-18 | End: 2024-07-23

## 2024-07-18 NOTE — TELEPHONE ENCOUNTER
Date of Last Office Visit: 5/23/24  Date of Next Office Visit:  7/23/24  No shows since last visit: No  More than one patient-initiated cancellation (with reschedule) since last seen in clinic? No    [x]Medication refilled per  Medication Refill in Ambulatory Care  policy.  []Medication unable to be refilled by RN due to criteria not met as indicated below:    []Eligibility: has not had a provider visit within last 6 months   []Supervision: no future appointment; < 7 days before next appointment   []Compliance: no shows; cancellations; lapse in therapy   []Verification: order discrepancy; may need modification...   []90-day supply request   []Advanced refill request: > 7 days before refill date   []Controlled medication   []Medication not included in policy   []Review: new med; med adjusted ? 30 days; safety alert; requires lab monitoring...   []Scope of Practice: refill request processed by LPN/MA   []Other:      Medication(s) requested:     -  atomoxetine (STRATTERA) 60 MG capsule   Date last ordered: 5/23/24  Qty: 30  Refills: 1      Any Controlled Substance(s)? No      Requested medication(s) verified as identical to current order? Yes    Any lapse in adherence to medication(s) greater than 5 days? No      Additional action taken? no.      Last visit treatment plan:     Treatment Plan:  Increase Strattera/atomoxetine to 60 mg daily for ADHD.  Continue Lexapro/escitalopram 20 mg daily for mood, anxiety.   Continue all other cares per primary care provider.   Continue all other medications as reviewed per electronic medical record today.   Safety plan reviewed. To the Emergency Department as needed or call after hours crisis line at [phone] or [phone]. [place] Crisis Text Line. Text [place] to 915821 or Suicide LifeLine Chat: suicidepreventionlifeline.org/chat  Continue therapy as planned.   Schedule an appointment with me in 2 months or sooner as needed    Any medication(s) require lab monitoring?  No

## 2024-07-19 NOTE — PROGRESS NOTES
[facility]  July 23, 2024      Behavioral Health Clinician Progress Note    Patient Name: Rivera Paz           Service Type:  Individual      Service Location:   [facility] / Email (patient reached)     Session Start Time: 9am  Session End Time: 925am      Session Length: 16 - 37      Attendees: Client     Service Modality:  Video Visit:      Provider verified identity through the following two step process.  Patient provided:  Patient is known previously to provider    Telemedicine Visit: The patient's condition can be safely assessed and treated via synchronous audio and visual telemedicine encounter.      Reason for Telemedicine Visit: Services only offered telehealth    Originating Site (Patient Location): Patient's home    Distant Site (Provider Location): Provider Remote Setting- Home Office    Consent:  The patient/guardian has verbally consented to: the potential risks and benefits of telemedicine (video visit) versus in person care; bill my insurance or make self-payment for services provided; and responsibility for payment of non-covered services.     Patient would like the video invitation sent by:  My Chart    Mode of Communication:  Video Conference via [facility]    Distant Location (Provider):  Off-site    As the provider I attest to compliance with applicable laws and regulations related to telemedicine.    Visit Activities (Refresh list every visit): [facility] Only    Diagnostic Assessment Date: 5/2/24 Joann Faria MA [facility]  Treatment Plan Review Date: 5/23/24  See Flowsheets for today's PHQ-9 and LUIS MIGUEL-7 results  Previous PHQ-9:       4/12/2024     9:21 AM 5/1/2024     5:33 PM 7/23/2024     8:31 AM   PHQ-9 SCORE   PHQ-9 Total Score [facility] 18 (Moderately severe depression) 25 (Severe depression) 10 (Moderate depression)   PHQ-9 Total Score 18 25    25 10     Previous LUIS MIGUEL-7:       4/4/2024    11:59 AM 4/12/2024     9:22 AM   LUIS MIGUEL-7 SCORE   Total Score   "14 (moderate anxiety)   Total Score 16 14       DATA  Extended Session (60+ minutes): No  Interactive Complexity: No  Crisis: No  [facility] Patient: No    Treatment Objective(s) Addressed in This Session:  Target Behavior(s): disease management/lifestyle changes mgmt of ADHD    Attention Problems: will develop coping skills to effectively manage attention issues    Current Stressors / Issues:  MH update: Increased Strattera.  Better.  More active doing yoga.  Doing pottery.  Being social.  Anxiety and depression better.  Getting up and doing things out of the house.  Hestitation/rumination, task motivation less.  Trips planned next few weeks.  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  \"Dark days since last appt\".  No plan/attempt- asked for support  HOSSEIN:  ETOH less maybe one at a social event, no more at home.  THC less.  Friend support  Side effects/compliance:  Interventions:  [facility] engaged in validation and support.  Pt reports access community has been helpful.  Most important:  Not looking for changes.  Feeling good.    5/23  MH update:  Started strattera.  Feeling better.  More motivation around the house.  Actively applying for jobs. More energy.  Depression /anxiety sx reduced to less then half.  Being active, garden/work out  Stresses:  Being social is tough/driving  Appetite: No changes  Sleep: Schedule is not great, but fatigue is better.  Outpatient Provider updates: Jasmine Rubio  SI/SIB/HI: Passive ideation current, no plan/intent; hx of aborted attempt at age 18, hx SIB  HOSSEIN: Trying to cut back.   THC daily (quaintly has reduced).  Drinking less, tried a week of sobriety.  Side effects/compliance: N/a  Interventions:  [facility] engaged " in goal exploration  Most important:  up on dose? Having some wear off in the afternoon    5/2  MH update:  ROS above.  Sx have been worsening.  Low motivation and energy.  Stresses:  Grief and loss of parent, difficult functioning   Appetite: Variable  Sleep: Fatigued  Outpatient Provider updates: Felecia Crawley, [facility]  SI/SIB/HI:  Passive ideation current, no plan/intent; hx of aborted attempt at age 18, hx SIB  HOSSEIN:  Desire to cut back.  Drinking weekly/wkd 1-7. THC daily.  Hx of shrooms/coke.  Will reconsier CD eval in the future.  Side effects/compliance: N/a  Interventions: [facility] engaged in completing DA with psychoeducation and tx plan  Most important:  ADD meds    Progress on Treatment Objective(s) / Homework:  New Objective established this session - CONTEMPLATION (Considering change and yet undecided); Intervened by assessing the negative and positive thinking (ambivalence) about behavior change    Motivational Interviewing    MI Intervention: Co-Developed Goal: mgmt of sx      Change Talk Expressed by the Patient: Desire to change Ability to change    Provider Response to Change Talk: E - Evoked more info from patient about behavior change and A - Affirmed patient's thoughts, decisions, or attempts at behavior change    Also provided psychoeducation about behavioral health condition, symptoms, and treatment options    Assessments completed prior to visit:  N/a    Care Plan review completed: Yes    Medication Review:  Changes to psychiatric medications, see updated Medication List in EPIC.     Medication Compliance:  Yes    Changes in Health Issues:   None reported    Chemical Use Review:   Substance Use: decrease in cannabis .  Patient reports frequency of use daily.  Reviewed information and resources for treatment and ongoing sobriety        Tobacco Use: No change in amount of tobacco use since last session.  Patient declined discussion at this time    Assessment: Current Emotional / Mental Status  (status of significant symptoms):  Risk status (Self / Other harm or suicidal ideation)  Patient has had a history of suicidal ideation: chronic current passive ideation, suicide attempts: hx of aborted attempt at 18 via hanging, and self-injurious behavior: hx of self harm  Patient denies current fears or concerns for personal safety.  Patient denies current or recent suicidal ideation or behaviors.  Patient denies current or recent homicidal ideation or behaviors.  Patient denies current or recent self injurious behavior or ideation.  Patient denies other safety concerns.  A safety and risk management plan has been developed including: Patient consented to co-developed safety plan.  A safety and risk management plan was completed.  Patient agreed to use safety plan should any safety concerns arise.  A copy was given to the patient.    Appearance:   Appropriate   Eye Contact:   Good   Psychomotor Behavior: Normal   Attitude:   Cooperative   Orientation:   All  Speech   Rate / Production: Normal    Volume:  Normal   Mood:    Normal  Affect:    Appropriate   Thought Content:  Clear   Thought Form:  Coherent  Logical   Insight:    Good     Diagnoses:  1. Moderate episode of recurrent major depressive disorder (H)    2. LUIS MIGUEL (generalized anxiety disorder)    3. Attention deficit hyperactivity disorder (ADHD), unspecified ADHD type          Collateral Reports Completed:  Communicated with: Dr Fisher    Plan: (Homework, other):  Patient was given information about behavioral services and encouraged to schedule a follow up appointment with the clinic [facility] as needed.  He was also given information about mental health symptoms and treatment options .  CD Recommendations: Practice Harm Reduction: reducing use .       Joann Faria [facility]    ______________________________________________________________________    Integrated Primary Care Behavioral Health Treatment Plan    Patient's Name: Rivera Jayme Paz  Date Of  Birth: 1995    Date of Creation: 5/23/24  Date Treatment Plan Last Reviewed/Revised: 5/23/24  1. Moderate episode of recurrent major depressive disorder (H)    2. LUIS MIGUEL (generalized anxiety disorder)    3. Attention deficit hyperactivity disorder (ADHD), unspecified ADHD type      DSM5 Diagnoses:   Psychosocial / Contextual Factors: grief and loss  PROMIS (reviewed every 90 days):    PROMIS-10 Scores      4/25/2024    12:35 PM   PROMIS-10 Scores Only   Global Mental Health Score 7    7   Global Physical Health Score 12    12   PROMIS TOTAL - SUBSCORES 19    19         Referral / Collaboration:  Referral to another professional/service is not indicated at this time..    Anticipated number of session for this episode of care: 5-6  Anticipation frequency of session: Monthly  Anticipated Duration of each session: 16-37 minutes  Treatment plan will be reviewed in 90 days or when goals have been changed.       MeasurableTreatment Goal(s) related to diagnosis / functional impairment(s)  Goal 1: Patient will build ADHD skills    I will know I've met my goal when I feel more motivation and go out.      Objective #A (Patient Action)    Patient will use cognitive strategies identified in therapy to challenge anxious thoughts.  Status: New - Date: 5/23/24      Intervention(s)  Therapist will provide support through CBT, MI, Acceptance and Commitment Therapy, Dialectic Behavioral Therapy and problem solving model to explore and overcome barriers.    Goal 2: Patient will manage concerns of safety    I will know I've met my goal when I can reduce suicidal ideation .      Objective #A (Patient Action)    Patient will use previously developed safety plan on file.  Status: New - Date: 5/23/24     Intervention(s)  Therapist will provide support through CBT, MI, Acceptance and Commitment Therapy, Dialectic Behavioral Therapy and problem solving model to explore and overcome barriers.      Patient has reviewed and agreed to the above  plan.      Joann Faria [facility]  July 23, 2024

## 2024-07-23 ENCOUNTER — VIRTUAL VISIT (OUTPATIENT)
Dept: BEHAVIORAL HEALTH | Facility: CLINIC | Age: 29
End: 2024-07-23
Payer: COMMERCIAL

## 2024-07-23 ENCOUNTER — VIRTUAL VISIT (OUTPATIENT)
Dept: PSYCHIATRY | Facility: CLINIC | Age: 29
End: 2024-07-23
Payer: COMMERCIAL

## 2024-07-23 VITALS — HEIGHT: 73 IN | WEIGHT: 185 LBS | BODY MASS INDEX: 24.52 KG/M2

## 2024-07-23 DIAGNOSIS — F41.1 GAD (GENERALIZED ANXIETY DISORDER): ICD-10-CM

## 2024-07-23 DIAGNOSIS — F33.41 RECURRENT MAJOR DEPRESSIVE DISORDER, IN PARTIAL REMISSION (H): ICD-10-CM

## 2024-07-23 DIAGNOSIS — F90.9 ATTENTION DEFICIT HYPERACTIVITY DISORDER (ADHD), UNSPECIFIED ADHD TYPE: ICD-10-CM

## 2024-07-23 DIAGNOSIS — F90.9 ATTENTION DEFICIT HYPERACTIVITY DISORDER (ADHD), UNSPECIFIED ADHD TYPE: Primary | ICD-10-CM

## 2024-07-23 DIAGNOSIS — F33.1 MODERATE EPISODE OF RECURRENT MAJOR DEPRESSIVE DISORDER (H): Primary | ICD-10-CM

## 2024-07-23 PROCEDURE — 99214 OFFICE O/P EST MOD 30 MIN: CPT | Mod: 95 | Performed by: PSYCHIATRY & NEUROLOGY

## 2024-07-23 PROCEDURE — 90832 PSYTX W PT 30 MINUTES: CPT | Mod: 95 | Performed by: COUNSELOR

## 2024-07-23 RX ORDER — ATOMOXETINE 60 MG/1
60 CAPSULE ORAL DAILY
Qty: 90 CAPSULE | Refills: 1 | Status: SHIPPED | OUTPATIENT
Start: 2024-07-23

## 2024-07-23 ASSESSMENT — PAIN SCALES - GENERAL: PAINLEVEL: NO PAIN (0)

## 2024-07-23 ASSESSMENT — PATIENT HEALTH QUESTIONNAIRE - PHQ9
10. IF YOU CHECKED OFF ANY PROBLEMS, HOW DIFFICULT HAVE THESE PROBLEMS MADE IT FOR YOU TO DO YOUR WORK, TAKE CARE OF THINGS AT HOME, OR GET ALONG WITH OTHER PEOPLE: SOMEWHAT DIFFICULT
SUM OF ALL RESPONSES TO PHQ QUESTIONS 1-9: 10
SUM OF ALL RESPONSES TO PHQ QUESTIONS 1-9: 10

## 2024-07-23 NOTE — PROGRESS NOTES
"Telemedicine Visit: The patient's condition can be safely assessed and treated via synchronous audio and visual telemedicine encounter.      Reason for Telemedicine Visit: Patient has requested telehealth visit    Originating Site (Patient Location): Patient's home    Distant Location (provider location):  Off-site    Consent:  The patient/guardian has verbally consented to: the potential risks and benefits of telemedicine (video visit) versus in person care; bill my insurance or make self-payment for services provided; and responsibility for payment of non-covered services.     Mode of Communication:  Video Conference via [employer]    As the provider I attest to compliance with applicable laws and regulations related to telemedicine.         Outpatient Psychiatric Progress Note    Name: Rivera Delacruz Jackson   : 1995                    Primary Care Provider: Leeroy Ewing PA-C   Therapist: Felecia Eastman     PHQ-9 scores:      2024     9:21 AM 2024     5:33 PM 2024     8:31 AM   PHQ-9 SCORE   PHQ-9 Total Score MyChart 18 (Moderately severe depression) 25 (Severe depression) 10 (Moderate depression)   PHQ-9 Total Score 18 25    25 10       LUIS MIGUEL-7 scores:      2024    11:59 AM 2024     9:22 AM   LUIS MIGUEL-7 SCORE   Total Score  14 (moderate anxiety)   Total Score 16 14       Patient Identification:  Patient is a 28 year old, single  White Not  or  male  who presents for return visit with me.  Patient is currently unemployed. Patient attended the phone/video session alone. Patient prefers to be called: \"Rivera\".    Interim History:  I last saw Rivera Paz for outpatient psychiatry return visit on 2024. During that appointment, we:    Increase Strattera/atomoxetine to 60 mg daily for ADHD.  Continue Lexapro/escitalopram 20 mg daily for mood, anxiety.   Continue all other cares per primary care provider.     : Patient overall doing quite well " "on current medication regimen.  Getting out more and socializing.  Engaged with friends.  Interviewing for jobs.  Tolerated medications well with no major negative side effects.  Taking medication as prescribed.  No acute safety concerns.  No SI.  Cannabis use but denies that it is currently causing any problems.  See [facility] note below for additional details.    Per [facility], Joann Faria [facility], during today's team-based visit:  Current Stressors / Issues:  MH update: Increased Strattera.  Better.  More active doing yoga.  Doing pottery.  Being social.  Anxiety and depression better.  Getting up and doing things out of the house.  Hestitation/rumination, task motivation less.  Trips planned next few weeks.  Stresses:  still looking for job, doing interviews; going to cannabis [employer] hoping to end up in industry   Appetite: lower at times  Sleep: More energy   Outpatient Provider updates: Felecia Crawley, [facility]  SI/SIB/HI: Passive ideation current, no plan/intent; hx of aborted attempt at age 18, hx SIB.  \"Dark days since last appt\".  No plan/attempt- asked for support  HOSSEIN:  ETOH less maybe one at a social event, no more at home.  THC less.  Friend support  Side effects/compliance:  Interventions:  [facility] engaged in validation and support.  Pt reports access community has been helpful.  Most important:  Not looking for changes.  Feeling good.    Past Psychiatric Med Trials:  Psych Meds at Intake:  Lexapro 20 mg daily - thinks it has been helpful to manage mood \"somewhat\", maybe a little tired    Wellbutrin  mg daily  - started to see if helpful as non-stimulant for ADHD     Past Psych Meds:  On meds for ADHD throughout elementary and middle school - does not recall names    Psychiatric ROS:  See HPI above for all pertinent positives and negatives. Rest of systems negative.     PHQ9 and GAD7 scores were reviewed today if completed.   Medication side effects: Denies  Current stressors include: Symptoms and see " HPI above  Coping mechanisms and supports include: Therapy, Family, and Hobbies    Current medications include:   Current Outpatient Medications   Medication Sig Dispense Refill    atomoxetine (STRATTERA) 60 MG capsule Take 1 capsule (60 mg) by mouth daily 30 capsule 0    escitalopram (LEXAPRO) 20 MG tablet TAKE 1 TABLET BY MOUTH DAILY( PATIENT DUE FOR APPOITMENT) 90 tablet 3     No current facility-administered medications for this visit.         Past Medical/Surgical History:  Past Medical History:   Diagnosis Date    Depressive disorder       has a past medical history of Depressive disorder.    He has no past medical history of Arthritis, Cancer (H), Cerebral infarction (H), Congestive heart failure (H), COPD (chronic obstructive pulmonary disease) (H), Diabetes (H), Heart disease, History of blood transfusion, Hypertension, Thyroid disease, or Uncomplicated asthma.    Social History:  Reviewed. No changes to social history except as noted above in HPI.    Vital Signs:   None. This is phone/video visit.     Labs:  Most recent laboratory results reviewed and no new labs.     Review of Systems:  10 systems (general, cardiovascular, respiratory, eyes, ENT, endocrine, GI, , M/S, neurological) were reviewed. Most pertinent finding(s) is/are:  denies fever, cough, persistent headaches, shortness of breath, chest pain, severe GI symptoms, trouble urinating, severe pain. The remaining systems are all unremarkable.    Mental Status Examination (limited as this is by phone/video):  Appearance: Awake, alert, appears stated age, no acute distress, well-groomed   Attitude:  cooperative  Motor: No gross abnormalities observed via video, not formally tested   Oriented to:  person, place, time, and situation  Attention Span and Concentration:  normal  Speech:  clear, coherent, regular rate, rhythm, and volume  Language: intact  Mood: Pretty good  Affect: Overall appropriate and mood congruent  Associations:  no loose  associations  Thought Process:  logical, linear and goal oriented  Thought Content:  no evidence of suicidal ideation or homicidal ideation, no evidence of psychotic thought, no auditory hallucinations present and no visual hallucinations present  Recent and Remote Memory:  Intact to interview. Not formally assessed. No amnesia.  Fund of Knowledge: appropriate  Insight:  good  Judgment:  intact, adequate for safety  Impulse Control:  intact    Suicide Risk Assessment:  Today Rivera Paz reports no suicidal ideation. Based on all available evidence including the factors cited above, Rivera Paz does not appear to be at imminent risk for self-harm, does not meet criteria for a 72-hr hold, and therefore remains appropriate for ongoing outpatient level of care.  A thorough assessment of risk factors related to suicide and self-harm have been reviewed and are noted above. The patient convincingly denies suicidality on several occasions. Local community safety resources reviewed for patient to use if needed. There was no deceit detected, and the patient presented in a manner that was believable.     DSM5 Diagnosis:  Attention-Deficit/Hyperactivity Disorder  314.01 (F90.9) Unspecified Attention -Deficit / Hyperactivity Disorder  Major Depressive Disorder, Recurrent Episode, in partial remission  300.02 (F41.1) Generalized Anxiety Disorder    Medical comorbidities include:   Patient Active Problem List    Diagnosis Date Noted    Moderate episode of recurrent major depressive disorder (H) 10/18/2018     Priority: Medium       Psychosocial & Contextual Factors: see HPI above    Assessment:  From Intake, 5/2/2024:  Rivera Paz is a 28-year-old male with past psychiatric history including depression, anxiety, ADHD who presents today for psychiatric evaluation.  Patient with long history mental health struggles.  Struggled with ADHD during adolescence.  One psychiatric hospitalization at age 18 for  suicidal ideation after a break-up with a girlfriend.  Patient has found Lexapro helpful for managing mood symptoms but has struggled more recently with ADHD symptoms.  Has tried to manage off ADHD medications for quite a while.  Struggling with task initiation, sustained attention, task completion.  Patient is working to cut back on alcohol and cannabis use.  Discussed due to daily cannabis use, and often more conservative prescribing practices within primary care, I am willing to pursue ADHD medication trials with nonstimulant medication at this time.  Patient is agreeable to discontinuing Wellbutrin XL since it has not been very helpful for ADHD.  We will start trial with Strattera/atomoxetine.  Recommend continue in psychotherapy.  Could consider further chemical dependency resources if indicated/patient desires.  No acute safety concerns.  No acute suicidality.  Passive suicidal ideation-safety planning discussed with [facility] today.  Safety plan on file.     5/23/2024:  Patient overall doing quite well on Strattera so far.  Could use additional benefit so we will further optimize dose.  No major negative side effects.  Has reduced cannabis use.  No acute safety concerns.  No SI.  Continues in psychotherapy.    7/23/2024:  Patient overall feeling stable on current medication regimen.  No medication changes today.  Primary care provider could consider increasing Strattera in the future for worsening ADHD symptoms.  Sometimes Strattera can also help with mood.  Could also consider increasing Lexapro up to 30 mg daily for anxiety or depression symptoms.  Patient encouraged to continue in psychotherapy.  Ongoing cannabis use.  No acute safety concerns.  No SI.      Medication side effects and alternatives were reviewed. Health promotion activities recommended and reviewed today. All questions addressed. Education and counseling completed regarding risks and benefits of medications and psychotherapy options. Recommend  therapy for additional support.     Treatment Plan:  Continue Strattera/atomoxetine to 60 mg daily for ADHD.  Continue Lexapro/escitalopram 20 mg daily for mood, anxiety.   Your psychiatric care is being returned back to your primary care provider for ongoing management.  Please reach out to your primary care provider with any questions or concerns about your mental health or mental health medications.  Continue all other cares per primary care provider.   Continue all other medications as reviewed per electronic medical record today.   Safety plan reviewed. To the Emergency Department as needed or call after hours crisis line at [phone] or [phone]. [place] Crisis Text Line. Text [place] to 308592 or Suicide LifeLine Chat: suicidepreOff Grid Electricline.org/chat  Continue therapy as planned.   Follow up with primary care provider as planned or for acute medical concerns.  [employer] may be used to communicate with your provider, but this is not intended to be used for emergencies.    Thank you for our work together in the Psychiatry Collaborative Care Model at [facility]. This is our last visit and I am returning your care back to your Primary Care Provider Leeroy Ewing PA-C . If you are not doing well, please contact your Primary Care Provider office.      Administrative Billing:   Phone Call/Video Duration: 5 Minutes  Start: 9:27a  Stop: 9:32a    Episode of Care #: 3    Patient Status:  The patient is being returned to the referring provider for ongoing care and medication prescribing.  The patient can be re-referred back to this service for further consultation if needed in the future.      Signed:   Prachi Fisher DO  [facility] Psychiat[facility]    Disclaimer: This note consists of symbols derived from keyboarding, dictation and/or voice recognition software. As a result, there may be errors in the script that have gone undetected. Please consider this when interpreting information found in this chart.

## 2024-07-23 NOTE — PROGRESS NOTES
"Virtual Visit Details    Type of service:  Video Visit   Video Start Time: {video visit start/end time for provider to select:568802}  Video End Time:{video visit start/end time for provider to select:901857}    Originating Location (pt. Location): {video visit patient location:169732::\"Home\"}  {PROVIDER LOCATION On-site should be selected for visits conducted from your clinic location or adjoining [facility] hospital, academic office, or other nearby [facility] building. Off-site should be selected for all other provider locations, including home:389320}  Distant Location (provider location):  {virtual location provider:121664}  Platform used for Video Visit: {Virtual Visit Platforms:144410::\"StatusPage\"}  "

## 2024-07-23 NOTE — PATIENT INSTRUCTIONS
Treatment Plan:  Continue Strattera/atomoxetine to 60 mg daily for ADHD.  Continue Lexapro/escitalopram 20 mg daily for mood, anxiety.   Your psychiatric care is being returned back to your primary care provider for ongoing management.  Please reach out to your primary care provider with any questions or concerns about your mental health or mental health medications.  Continue all other cares per primary care provider.   Continue all other medications as reviewed per electronic medical record today.   Safety plan reviewed. To the Emergency Department as needed or call after hours crisis line at [phone] or [phone]. [place] Crisis Text Line. Text [place] to 506484 or Suicide LifeLine Chat: suicidepreventionlifeline.org/chat  Continue therapy as planned.   Follow up with primary care provider as planned or for acute medical concerns.  Tackle Grabt may be used to communicate with your provider, but this is not intended to be used for emergencies.    Thank you for our work together in the Psychiatry Collaborative Care Model at [facility]. This is our last visit and I am returning your care back to your Primary Care Provider Leeroy Ewing PA-C . If you are not doing well, please contact your Primary Care Provider office.      Moving from: Collaborative Care Psychiatry Service ([facility])    Moving to: Referring Provider        We are returning your care back to your Referring Provider.      We will update your Referring Provider/Clinic that you've completed your care with [facility]. This way, they can help you build on the progress you've made in your mental health.        Here's what happens next:    Within the next 3 months: Please set up a visit with your referring provider. Ask for a mental health check-in.  Stay on your current medications--do not change your doses. Your symptoms could get worse if you quickly stop or decrease your medicine.  We've refilled your mental health medications for the next 3  months (90 days). Future refills or dose changes should come from your Referring Provider Clinic.    If you're in therapy, keep it up! If you're not but would like to start, ask your Referring Provider clinic for a referral to therapy, or call [facility] ([phone]) to set up a visit with a therapist.        It's been a pleasure to work with you! If you and your clinic decide that you should return to [place] [facility] in the future, we remain ready to serve you. Ask your clinic for a new referral if needed.

## 2024-07-23 NOTE — NURSING NOTE
Current patient location: [address]  [facility] [address]    Is the patient currently in the state of [place]? YES    Visit mode:VIDEO    If the visit is dropped, the patient can be reconnected by: VIDEO VISIT: Send to e-mail at: [email or web address]    Will anyone else be joining the visit? NO  (If patient encounters technical issues they should call [phone]    How would you like to obtain your AVS? MyChart    Are changes needed to the allergy or medication list? No    Are refills needed on medications prescribed by this physician? NO    Reason for visit: RECHECK    Malaika ROA

## 2024-07-23 NOTE — PROGRESS NOTES
RETURN TO REFERRING PROVIDER FINAL TREATMENT PLAN  The Psychiatric provider and/or Behavioral health clinician ([facility]) have completed consultation with the patient and are returning to referring provider care for continued care. For worsening symptoms/condition recommendations include:    Medication Recommendations   Continue Strattera/atomoxetine to 60 mg daily for ADHD.  Can consider increasing up to 80 mg daily if symptoms worsen.  Can increase up to 100 mg max dose daily.  Monitor blood pressure, sleep, and watch for constipation and dryness.  Continue Lexapro/escitalopram 20 mg daily for mood, anxiety.  Could consider increasing up to 30 mg daily if symptoms of anxiety and depression worsen.    Behavioral Recommendations  -Patient encouraged to continue in psychotherapy.      Consider pharmacologic and nonpharmacologic recommendations, if the patient has followed through on recommendations/referrals and any other helpful pertinent information (e.g., recent stressors, med adherence, enough time on the medication or high enough dose etc.)      If post consult recommendations fail, use any of the following options   Reach out to the [facility] provider through Epic staff message for guidance   Order an Adult Behavioral Health eConsult (ABDQ141) or Pediatric eConsult (QQMX933)   Refer for another [facility] consultation (after 6 months) or refer to Psychiatry/Long-term management (Mental Health Referral 9035, [facility]/Med management and Long-term management)       Prachi Fisher DO on 7/23/2024 at 9:55 AM

## 2024-10-15 ASSESSMENT — ANXIETY QUESTIONNAIRES
6. BECOMING EASILY ANNOYED OR IRRITABLE: MORE THAN HALF THE DAYS
7. FEELING AFRAID AS IF SOMETHING AWFUL MIGHT HAPPEN: MORE THAN HALF THE DAYS
GAD7 TOTAL SCORE: 15
GAD7 TOTAL SCORE: 15
3. WORRYING TOO MUCH ABOUT DIFFERENT THINGS: NEARLY EVERY DAY
4. TROUBLE RELAXING: NEARLY EVERY DAY
7. FEELING AFRAID AS IF SOMETHING AWFUL MIGHT HAPPEN: MORE THAN HALF THE DAYS
GAD7 TOTAL SCORE: 15
IF YOU CHECKED OFF ANY PROBLEMS ON THIS QUESTIONNAIRE, HOW DIFFICULT HAVE THESE PROBLEMS MADE IT FOR YOU TO DO YOUR WORK, TAKE CARE OF THINGS AT HOME, OR GET ALONG WITH OTHER PEOPLE: VERY DIFFICULT
1. FEELING NERVOUS, ANXIOUS, OR ON EDGE: MORE THAN HALF THE DAYS
5. BEING SO RESTLESS THAT IT IS HARD TO SIT STILL: SEVERAL DAYS
8. IF YOU CHECKED OFF ANY PROBLEMS, HOW DIFFICULT HAVE THESE MADE IT FOR YOU TO DO YOUR WORK, TAKE CARE OF THINGS AT HOME, OR GET ALONG WITH OTHER PEOPLE?: VERY DIFFICULT
2. NOT BEING ABLE TO STOP OR CONTROL WORRYING: MORE THAN HALF THE DAYS

## 2024-10-21 ASSESSMENT — PATIENT HEALTH QUESTIONNAIRE - PHQ9
10. IF YOU CHECKED OFF ANY PROBLEMS, HOW DIFFICULT HAVE THESE PROBLEMS MADE IT FOR YOU TO DO YOUR WORK, TAKE CARE OF THINGS AT HOME, OR GET ALONG WITH OTHER PEOPLE: EXTREMELY DIFFICULT
SUM OF ALL RESPONSES TO PHQ QUESTIONS 1-9: 16
SUM OF ALL RESPONSES TO PHQ QUESTIONS 1-9: 16

## 2024-10-22 ENCOUNTER — VIRTUAL VISIT (OUTPATIENT)
Dept: FAMILY MEDICINE | Facility: CLINIC | Age: 29
End: 2024-10-22
Payer: COMMERCIAL

## 2024-10-22 DIAGNOSIS — F33.41 RECURRENT MAJOR DEPRESSIVE DISORDER, IN PARTIAL REMISSION (H): ICD-10-CM

## 2024-10-22 DIAGNOSIS — F90.9 ATTENTION DEFICIT HYPERACTIVITY DISORDER (ADHD), UNSPECIFIED ADHD TYPE: Primary | ICD-10-CM

## 2024-10-22 PROCEDURE — 99213 OFFICE O/P EST LOW 20 MIN: CPT | Mod: 95 | Performed by: PHYSICIAN ASSISTANT

## 2024-10-22 NOTE — PROGRESS NOTES
Rivera is a 29 year old who is being evaluated via a billable video visit.    How would you like to obtain your AVS? MyChart  If the video visit is dropped, the invitation should be resent by: Send to e-mail at: [email or web address]  Will anyone else be joining your video visit? No      Assessment & Plan     Attention deficit hyperactivity disorder (ADHD), unspecified ADHD type  Has been working with psychiatry, failed strattera.  Will contact them to see if they want to set up follow up or adjust meds    Recurrent major depressive disorder, in partial remission (H)  Overall stable, feels like meds are helpng.          Depression Screening Follow Up        10/21/2024     7:06 PM   PHQ   PHQ-9 Total Score 16   Q9: Thoughts of better off dead/self-harm past 2 weeks Several days    F/U: Thoughts of suicide or self-harm No    F/U: Safety concerns No        Patient-reported                     Follow Up Actions Taken  Crisis resource information provided in the After Visit Summary  Referred patient back to mental health provider    Discussed the following ways the patient can remain in a safe environment:  be around others        Subjective   Rivera is a 29 year old, presenting for the following health issues:  No chief complaint on file.        4/12/2024    10:10 AM   Additional Questions   Roomed by Brooklynn   Accompanied by n/a       Video Start Time: 1200    History of Present Illness       Mental Health Follow-up:  Patient presents to follow-up on Depression & Anxiety.Patient's depression since last visit has been:  Medium  The patient is having other symptoms associated with depression.  Patient's anxiety since last visit has been:  Bad  The patient is having other symptoms associated with anxiety.  Any significant life events: relationship concerns, job concerns and financial concerns  Patient is feeling anxious or having panic attacks.  Patient has no concerns about alcohol or drug use.    Reason for visit:   ADD Medication    He eats 2-3 servings of fruits and vegetables daily.He consumes 3 sweetened beverage(s) daily.He exercises with enough effort to increase his heart rate 30 to 60 minutes per day.  He exercises with enough effort to increase his heart rate 4 days per week. He is missing 2 dose(s) of medications per week.  He is not taking prescribed medications regularly due to side effects.               Review of Systems  Constitutional, HEENT, cardiovascular, pulmonary, gi and gu systems are negative, except as otherwise noted.      Objective           Vitals:  No vitals were obtained today due to virtual visit.    Physical Exam   GENERAL: alert and no distress  EYES: Eyes grossly normal to inspection.  No discharge or erythema, or obvious scleral/conjunctival abnormalities.  RESP: No audible wheeze, cough, or visible cyanosis.    SKIN: Visible skin clear. No significant rash, abnormal pigmentation or lesions.  NEURO: Cranial nerves grossly intact.  Mentation and speech appropriate for age.  PSYCH: Appropriate affect, tone, and pace of words          Video-Visit Details    Type of service:  Video Visit   Video End Time:1211  Originating Location (pt. Location): Home    Distant Location (provider location):  Off-site  Platform used for Video Visit: Rossy  Signed Electronically by: Leeroy Ewing PA-C

## 2024-10-22 NOTE — Clinical Note
Hi Dr. Fisher,  I talked to Rivera recently, and he has not had success with the strattera.  I know you released him back for refills, but since he is requested an adjustment, I told him I would reach out to you to either help schedule a follow up or what next steps might look like.  Thanks so much  Darryl Ewing PA-C

## 2025-03-18 ENCOUNTER — PATIENT OUTREACH (OUTPATIENT)
Dept: CARE COORDINATION | Facility: CLINIC | Age: 30
End: 2025-03-18
Payer: COMMERCIAL

## 2025-04-01 ENCOUNTER — PATIENT OUTREACH (OUTPATIENT)
Dept: CARE COORDINATION | Facility: CLINIC | Age: 30
End: 2025-04-01
Payer: COMMERCIAL

## 2025-05-17 ENCOUNTER — HEALTH MAINTENANCE LETTER (OUTPATIENT)
Age: 30
End: 2025-05-17

## 2025-06-22 NOTE — NURSING NOTE
Is the patient currently in the state of [place]? YES    Visit mode:VIDEO    If the visit is dropped, the patient can be reconnected by: VIDEO VISIT: Send to e-mail at: [email or web address]    Will anyone else be joining the visit? NO  (If patient encounters technical issues they should call [phone]    How would you like to obtain your AVS? MyChart    Are changes needed to the allergy or medication list? No    Are refills needed on medications prescribed by this physician? NO    Reason for visit: Consult    Malaika ROA      
No